# Patient Record
Sex: FEMALE | Race: WHITE | NOT HISPANIC OR LATINO | URBAN - METROPOLITAN AREA
[De-identification: names, ages, dates, MRNs, and addresses within clinical notes are randomized per-mention and may not be internally consistent; named-entity substitution may affect disease eponyms.]

---

## 2023-04-18 LAB
ALANINE AMINOTRANSFERASE (SGPT) (U/L) IN SER/PLAS: 42 U/L (ref 7–45)
ALBUMIN (G/DL) IN SER/PLAS: 4 G/DL (ref 3.4–5)
ALKALINE PHOSPHATASE (U/L) IN SER/PLAS: 68 U/L (ref 33–110)
ANION GAP IN SER/PLAS: 12 MMOL/L (ref 10–20)
ASPARTATE AMINOTRANSFERASE (SGOT) (U/L) IN SER/PLAS: 22 U/L (ref 9–39)
BILIRUBIN TOTAL (MG/DL) IN SER/PLAS: 0.8 MG/DL (ref 0–1.2)
CALCIDIOL (25 OH VITAMIN D3) (NG/ML) IN SER/PLAS: 29 NG/ML
CALCIUM (MG/DL) IN SER/PLAS: 8.9 MG/DL (ref 8.6–10.3)
CARBON DIOXIDE, TOTAL (MMOL/L) IN SER/PLAS: 27 MMOL/L (ref 21–32)
CHLORIDE (MMOL/L) IN SER/PLAS: 104 MMOL/L (ref 98–107)
CHOLESTEROL (MG/DL) IN SER/PLAS: 208 MG/DL (ref 0–199)
CHOLESTEROL IN HDL (MG/DL) IN SER/PLAS: 55 MG/DL
CHOLESTEROL/HDL RATIO: 3.8
CREATININE (MG/DL) IN SER/PLAS: 0.91 MG/DL (ref 0.5–1.05)
ERYTHROCYTE DISTRIBUTION WIDTH (RATIO) BY AUTOMATED COUNT: 13.1 % (ref 11.5–14.5)
ERYTHROCYTE MEAN CORPUSCULAR HEMOGLOBIN CONCENTRATION (G/DL) BY AUTOMATED: 32.2 G/DL (ref 32–36)
ERYTHROCYTE MEAN CORPUSCULAR VOLUME (FL) BY AUTOMATED COUNT: 86 FL (ref 80–100)
ERYTHROCYTES (10*6/UL) IN BLOOD BY AUTOMATED COUNT: 4.87 X10E12/L (ref 4–5.2)
GFR FEMALE: 77 ML/MIN/1.73M2
GLUCOSE (MG/DL) IN SER/PLAS: 70 MG/DL (ref 74–99)
HEMATOCRIT (%) IN BLOOD BY AUTOMATED COUNT: 41.9 % (ref 36–46)
HEMOGLOBIN (G/DL) IN BLOOD: 13.5 G/DL (ref 12–16)
LDL: 125 MG/DL (ref 0–99)
LEUKOCYTES (10*3/UL) IN BLOOD BY AUTOMATED COUNT: 6.5 X10E9/L (ref 4.4–11.3)
PLATELETS (10*3/UL) IN BLOOD AUTOMATED COUNT: 289 X10E9/L (ref 150–450)
POTASSIUM (MMOL/L) IN SER/PLAS: 4.2 MMOL/L (ref 3.5–5.3)
PROTEIN TOTAL: 6.6 G/DL (ref 6.4–8.2)
SODIUM (MMOL/L) IN SER/PLAS: 139 MMOL/L (ref 136–145)
THYROTROPIN (MIU/L) IN SER/PLAS BY DETECTION LIMIT <= 0.05 MIU/L: 2.29 MIU/L (ref 0.44–3.98)
TRIGLYCERIDE (MG/DL) IN SER/PLAS: 140 MG/DL (ref 0–149)
UREA NITROGEN (MG/DL) IN SER/PLAS: 11 MG/DL (ref 6–23)
VLDL: 28 MG/DL (ref 0–40)

## 2023-08-21 ENCOUNTER — APPOINTMENT (OUTPATIENT)
Dept: PRIMARY CARE | Facility: CLINIC | Age: 48
End: 2023-08-21
Payer: COMMERCIAL

## 2023-08-22 PROBLEM — M26.629 TMJ SYNDROME: Status: ACTIVE | Noted: 2023-08-22

## 2023-08-22 PROBLEM — E55.9 VITAMIN D DEFICIENCY: Status: ACTIVE | Noted: 2023-08-22

## 2023-08-22 PROBLEM — R22.1 NECK MASS: Status: ACTIVE | Noted: 2023-08-22

## 2023-08-22 PROBLEM — I83.819 VARICOSE VEINS WITH PAIN: Status: ACTIVE | Noted: 2023-08-22

## 2023-08-22 PROBLEM — E04.1 SOLITARY THYROID NODULE: Status: ACTIVE | Noted: 2023-08-22

## 2023-08-22 PROBLEM — R20.2 PARESTHESIA OF LEFT LEG: Status: ACTIVE | Noted: 2023-08-22

## 2023-08-22 PROBLEM — G43.909 MIGRAINE HEADACHE: Status: ACTIVE | Noted: 2023-08-22

## 2023-08-22 PROBLEM — R56.9 SEIZURE (MULTI): Status: ACTIVE | Noted: 2023-08-22

## 2023-08-22 PROBLEM — F41.9 ANXIETY: Status: ACTIVE | Noted: 2023-08-22

## 2023-08-22 PROBLEM — R20.0 NUMBNESS ON LEFT SIDE: Status: ACTIVE | Noted: 2023-08-22

## 2023-08-22 PROBLEM — G57.12 MERALGIA PARESTHETICA OF LEFT SIDE: Status: ACTIVE | Noted: 2023-08-22

## 2023-08-22 PROBLEM — H69.90 EUSTACHIAN TUBE DYSFUNCTION: Status: ACTIVE | Noted: 2023-08-22

## 2023-08-22 PROBLEM — E87.6 HYPOKALEMIA: Status: ACTIVE | Noted: 2023-08-22

## 2023-08-22 PROBLEM — G37.9 DEMYELINATING DISEASE (MULTI): Status: ACTIVE | Noted: 2023-08-22

## 2023-08-22 PROBLEM — I83.93 VARICOSE VEINS OF LEGS: Status: ACTIVE | Noted: 2023-08-22

## 2023-08-22 PROBLEM — R07.0 THROAT DISCOMFORT: Status: ACTIVE | Noted: 2023-08-22

## 2023-08-22 PROBLEM — I87.2 VENOUS INSUFFICIENCY: Status: ACTIVE | Noted: 2023-08-22

## 2023-08-22 PROBLEM — R20.0 NUMBNESS OF LEFT FOOT: Status: ACTIVE | Noted: 2023-08-22

## 2023-08-22 PROBLEM — J32.9 SINUSITIS: Status: ACTIVE | Noted: 2023-08-22

## 2023-08-22 PROBLEM — R09.A2 GLOBUS PHARYNGEUS: Status: ACTIVE | Noted: 2023-08-22

## 2023-08-22 PROBLEM — H92.02 OTALGIA OF LEFT EAR: Status: ACTIVE | Noted: 2023-08-22

## 2023-08-22 PROBLEM — E04.9 GOITER: Status: ACTIVE | Noted: 2023-08-22

## 2023-08-22 PROBLEM — N39.0 UTI (URINARY TRACT INFECTION): Status: ACTIVE | Noted: 2023-08-22

## 2023-08-22 RX ORDER — MULTIVITAMIN
TABLET ORAL
COMMUNITY
Start: 2017-10-31

## 2023-08-23 ENCOUNTER — OFFICE VISIT (OUTPATIENT)
Dept: PRIMARY CARE | Facility: CLINIC | Age: 48
End: 2023-08-23
Payer: COMMERCIAL

## 2023-08-23 VITALS
HEIGHT: 66 IN | DIASTOLIC BLOOD PRESSURE: 84 MMHG | SYSTOLIC BLOOD PRESSURE: 126 MMHG | HEART RATE: 77 BPM | RESPIRATION RATE: 16 BRPM | BODY MASS INDEX: 30.31 KG/M2 | OXYGEN SATURATION: 97 % | WEIGHT: 188.6 LBS | TEMPERATURE: 97.3 F

## 2023-08-23 DIAGNOSIS — E04.9 GOITER: ICD-10-CM

## 2023-08-23 DIAGNOSIS — E55.9 VITAMIN D DEFICIENCY: ICD-10-CM

## 2023-08-23 DIAGNOSIS — K64.4 EXTERNAL HEMORRHOID: Primary | ICD-10-CM

## 2023-08-23 PROCEDURE — 99214 OFFICE O/P EST MOD 30 MIN: CPT | Performed by: INTERNAL MEDICINE

## 2023-08-23 PROCEDURE — 1036F TOBACCO NON-USER: CPT | Performed by: INTERNAL MEDICINE

## 2023-08-23 RX ORDER — HYDROCORTISONE 25 MG/G
CREAM TOPICAL 4 TIMES DAILY PRN
Qty: 30 G | Refills: 5 | Status: SHIPPED | OUTPATIENT
Start: 2023-08-23 | End: 2024-03-29 | Stop reason: ALTCHOICE

## 2023-08-23 ASSESSMENT — ENCOUNTER SYMPTOMS
PSYCHIATRIC NEGATIVE: 1
COLOR CHANGE: 0
EYE DISCHARGE: 0
WEAKNESS: 0
WOUND: 0
NECK STIFFNESS: 0
LOSS OF SENSATION IN FEET: 0
RECTAL PAIN: 1
ABDOMINAL PAIN: 0
LIGHT-HEADEDNESS: 0
TROUBLE SWALLOWING: 0
DEPRESSION: 0
FREQUENCY: 0
HEMATOLOGIC/LYMPHATIC NEGATIVE: 1
DIFFICULTY URINATING: 0
CONSTITUTIONAL NEGATIVE: 1
HEADACHES: 0
CONSTIPATION: 0
ADENOPATHY: 0
BRUISES/BLEEDS EASILY: 0
NECK PAIN: 0
COUGH: 0
SLEEP DISTURBANCE: 0
FACIAL ASYMMETRY: 0
CHEST TIGHTNESS: 0
POLYDIPSIA: 0
EYE PAIN: 0
ENDOCRINE NEGATIVE: 1
BACK PAIN: 0
TREMORS: 0
SINUS PAIN: 0
ALLERGIC/IMMUNOLOGIC NEGATIVE: 1
SHORTNESS OF BREATH: 0
EYE REDNESS: 0
JOINT SWELLING: 0
MUSCULOSKELETAL NEGATIVE: 1
SEIZURES: 0
CARDIOVASCULAR NEGATIVE: 1
SINUS PRESSURE: 0
AGITATION: 0
BLOOD IN STOOL: 0
DIARRHEA: 0
NERVOUS/ANXIOUS: 0
HALLUCINATIONS: 0
ACTIVITY CHANGE: 0
UNEXPECTED WEIGHT CHANGE: 0
POLYPHAGIA: 0
APPETITE CHANGE: 0
NAUSEA: 0
OCCASIONAL FEELINGS OF UNSTEADINESS: 0
RESPIRATORY NEGATIVE: 1
STRIDOR: 0
MYALGIAS: 0
VOMITING: 0
EYES NEGATIVE: 1
WHEEZING: 0
SORE THROAT: 0
DIZZINESS: 0
NEUROLOGICAL NEGATIVE: 1
NUMBNESS: 0
FLANK PAIN: 0
PALPITATIONS: 0
VOICE CHANGE: 0
SPEECH DIFFICULTY: 0
ARTHRALGIAS: 0
CONFUSION: 0
DYSURIA: 0

## 2023-08-23 ASSESSMENT — PATIENT HEALTH QUESTIONNAIRE - PHQ9
2. FEELING DOWN, DEPRESSED OR HOPELESS: NOT AT ALL
1. LITTLE INTEREST OR PLEASURE IN DOING THINGS: NOT AT ALL
SUM OF ALL RESPONSES TO PHQ9 QUESTIONS 1 AND 2: 0

## 2023-08-23 NOTE — PROGRESS NOTES
Subjective   Patient ID: Chelsey Anand is a 48 y.o. female who presents for Hemorrhoids (Patient is here to discuss hemorrhoids. /).  HPI    Patient has been concerned about discomfort related to hemorrhoids. She developed hemorrhoids after delivering her child. Initially had some episodes of bleeding which subsequently resolved.  Denies any rectal bleed recently, she gets discomfort and itching on and off especially when sitting.  She works in the dental office, sitting most of time.  Denies constipation.  We reviewed last colonoscopy report: 3/2022.  Internal hemorhoids noted  Another concern is thyroid goiter and thyroid nodules. Patient used to see endocrinologist at [facility] who relocated. Will refer to  endocrinologist.        Review of Systems   Constitutional: Negative.  Negative for activity change, appetite change and unexpected weight change.   HENT: Negative.  Negative for congestion, ear discharge, ear pain, hearing loss, mouth sores, nosebleeds, sinus pressure, sinus pain, sore throat, trouble swallowing and voice change.    Eyes: Negative.  Negative for pain, discharge, redness and visual disturbance.   Respiratory: Negative.  Negative for cough, chest tightness, shortness of breath, wheezing and stridor.    Cardiovascular: Negative.  Negative for chest pain, palpitations and leg swelling.   Gastrointestinal:  Positive for rectal pain. Negative for abdominal pain, blood in stool, constipation, diarrhea, nausea and vomiting.   Endocrine: Negative.  Negative for polydipsia, polyphagia and polyuria.   Genitourinary: Negative.  Negative for difficulty urinating, dysuria, flank pain, frequency and urgency.   Musculoskeletal: Negative.  Negative for arthralgias, back pain, gait problem, joint swelling, myalgias, neck pain and neck stiffness.   Skin: Negative.  Negative for color change, rash and wound.   Allergic/Immunologic: Negative.  Negative for environmental allergies, food allergies and  "immunocompromised state.   Neurological: Negative.  Negative for dizziness, tremors, seizures, syncope, facial asymmetry, speech difficulty, weakness, light-headedness, numbness and headaches.   Hematological: Negative.  Negative for adenopathy. Does not bruise/bleed easily.   Psychiatric/Behavioral: Negative.  Negative for agitation, behavioral problems, confusion, hallucinations, sleep disturbance and suicidal ideas. The patient is not nervous/anxious.    All other systems reviewed and are negative.      Objective     Review of systems was performed and all systems were negative except what in HPI    /84   Pulse 77   Temp 36.3 °C (97.3 °F)   Resp 16   Ht 1.676 m (5' 6\")   Wt 85.5 kg (188 lb 9.6 oz)   SpO2 97%   BMI 30.44 kg/m²    Physical Exam  Vitals and nursing note reviewed.   Constitutional:       General: She is not in acute distress.     Appearance: Normal appearance.   HENT:      Head: Normocephalic and atraumatic.      Right Ear: External ear normal.      Left Ear: External ear normal.      Nose: Nose normal. No congestion or rhinorrhea.   Eyes:      General:         Right eye: No discharge.         Left eye: No discharge.      Extraocular Movements: Extraocular movements intact.      Conjunctiva/sclera: Conjunctivae normal.      Pupils: Pupils are equal, round, and reactive to light.   Neck:      Comments: Mildly enlarged thyroid gland noted.   Cardiovascular:      Rate and Rhythm: Normal rate and regular rhythm.      Pulses: Normal pulses.      Heart sounds: Normal heart sounds. No murmur heard.     No friction rub. No gallop.   Pulmonary:      Effort: Pulmonary effort is normal. No respiratory distress.      Breath sounds: Normal breath sounds. No stridor. No wheezing, rhonchi or rales.   Chest:      Chest wall: No tenderness.   Abdominal:      General: Bowel sounds are normal.      Palpations: Abdomen is soft. There is no mass.      Tenderness: There is no abdominal tenderness. There is no " guarding or rebound.   Genitourinary:     Rectum: Guaiac result negative.   Musculoskeletal:         General: No swelling or deformity. Normal range of motion.      Cervical back: Normal range of motion and neck supple. No rigidity or tenderness.      Right lower leg: No edema.      Left lower leg: No edema.   Lymphadenopathy:      Cervical: No cervical adenopathy.   Skin:     General: Skin is warm and dry.      Coloration: Skin is not jaundiced.      Findings: No bruising or erythema.   Neurological:      General: No focal deficit present.      Mental Status: She is alert and oriented to person, place, and time. Mental status is at baseline.      Cranial Nerves: No cranial nerve deficit.      Motor: No weakness.      Coordination: Coordination normal.      Gait: Gait normal.   Psychiatric:         Mood and Affect: Mood normal.         Behavior: Behavior normal.         Thought Content: Thought content normal.         Judgment: Judgment normal.     Rectal exam: small external hemorrhoid noted without signs of bleeding.  Sphincter tone normal.  Internal hemorrhoids felt, no other rectal masses felt. Stool Guaiac: negative.     Assessment/Plan   Problem List Items Addressed This Visit          Endocrine/Metabolic    Goiter     Consult endocrinologist.         Relevant Orders    Referral to Endocrinology    Vitamin D deficiency     Continue Vit d supplement.             Gastrointestinal and Abdominal    External hemorrhoid - Primary     Avoid constipation. Use preparation H as needed, Hydrocortisone cream if you experience rectal itching and preparation H not effective.   Consider rectal surgery consult if symptoms get worse and/or rectal bleed starts.          Relevant Medications    hydrocortisone (Anusol-HC) 2.5 % rectal cream     It was a pleasure to see you today.  I would like to remind you about importance of a healthy lifestyle in order to improve your well-being and live longer.  Try to engage in physical  activities for at least 150 minutes per week.  Eat about 10 servings of fruits and vegetables daily. My advice is 2 servings of fruits and 8 servings of vegetables.  For vegetables choose at least half of them green and at least half of them fresh.  Please avoid sugar, salt, fried food and saturated fat.    I spent a total of 30 minutes on the date of service for follow up visit, which included preparing to see the patient, face-to-face patient care, completing clinical documentation, obtaining and/or reviewing separately obtained history, performing a medically appropriate examination, counseling and educating the patient/family/caregiver, ordering medications, tests, or procedures, communicating with other health care providers (not separately reported), independently interpreting results (not separately reported), communicating results to the patient/family/caregiver, and care coordination (not separately reported).

## 2023-08-24 NOTE — ASSESSMENT & PLAN NOTE
Avoid constipation. Use preparation H as needed, Hydrocortisone cream if you experience rectal itching and preparation H not effective.   Consider rectal surgery consult if symptoms get worse and/or rectal bleed starts.

## 2023-09-12 ENCOUNTER — TELEPHONE (OUTPATIENT)
Dept: PRIMARY CARE | Facility: CLINIC | Age: 48
End: 2023-09-12
Payer: COMMERCIAL

## 2023-09-12 DIAGNOSIS — E04.1 SOLITARY THYROID NODULE: Primary | ICD-10-CM

## 2023-09-12 NOTE — TELEPHONE ENCOUNTER
Patient is unable to get in with Endocrinology until May of 2024. Asking if Dr. Au would be able to order thyroid ultrasound and blood work? Would like to be seen sooner please advise     [phone]

## 2023-09-15 ENCOUNTER — LAB (OUTPATIENT)
Dept: LAB | Facility: LAB | Age: 48
End: 2023-09-15
Payer: COMMERCIAL

## 2023-09-15 DIAGNOSIS — E04.1 SOLITARY THYROID NODULE: ICD-10-CM

## 2023-09-15 LAB
THYROTROPIN (MIU/L) IN SER/PLAS BY DETECTION LIMIT <= 0.05 MIU/L: 3.03 MIU/L (ref 0.44–3.98)
THYROXINE (T4) FREE (NG/DL) IN SER/PLAS: 0.97 NG/DL (ref 0.61–1.12)
TRIIODOTHYRONINE (T3) (NG/DL) IN SER/PLAS: 121 NG/DL (ref 60–200)

## 2023-09-15 PROCEDURE — 84480 ASSAY TRIIODOTHYRONINE (T3): CPT

## 2023-09-15 PROCEDURE — 84439 ASSAY OF FREE THYROXINE: CPT

## 2023-09-15 PROCEDURE — 36415 COLL VENOUS BLD VENIPUNCTURE: CPT

## 2023-09-15 PROCEDURE — 84443 ASSAY THYROID STIM HORMONE: CPT

## 2023-09-17 DIAGNOSIS — E04.2 MULTIPLE THYROID NODULES: Primary | ICD-10-CM

## 2023-10-09 ENCOUNTER — OFFICE VISIT (OUTPATIENT)
Dept: OTOLARYNGOLOGY | Facility: CLINIC | Age: 48
End: 2023-10-09
Payer: COMMERCIAL

## 2023-10-09 VITALS — HEIGHT: 67 IN | BODY MASS INDEX: 29.91 KG/M2 | WEIGHT: 190.6 LBS

## 2023-10-09 DIAGNOSIS — E04.2 MULTIPLE THYROID NODULES: ICD-10-CM

## 2023-10-09 DIAGNOSIS — R09.A2 GLOBUS SENSATION: Primary | ICD-10-CM

## 2023-10-09 PROCEDURE — 31575 DIAGNOSTIC LARYNGOSCOPY: CPT | Performed by: OTOLARYNGOLOGY

## 2023-10-09 PROCEDURE — 99244 OFF/OP CNSLTJ NEW/EST MOD 40: CPT | Performed by: OTOLARYNGOLOGY

## 2023-10-09 PROCEDURE — 1036F TOBACCO NON-USER: CPT | Performed by: OTOLARYNGOLOGY

## 2023-10-09 RX ORDER — ACETAMINOPHEN 500 MG
125 TABLET ORAL DAILY
COMMUNITY

## 2023-10-09 ASSESSMENT — PATIENT HEALTH QUESTIONNAIRE - PHQ9
1. LITTLE INTEREST OR PLEASURE IN DOING THINGS: NOT AT ALL
SUM OF ALL RESPONSES TO PHQ9 QUESTIONS 1 & 2: 0
2. FEELING DOWN, DEPRESSED OR HOPELESS: NOT AT ALL

## 2023-10-09 NOTE — PROGRESS NOTES
History Of Present Illness  Chelsey Anand is a 48 y.o. female presenting to establish care for thyroid nodule.  Patient states that she has had this nodule followed for close to 10 years.  She previously had a biopsy that was reportedly benign.  She has ultrasounds dating back to 2013.  At that time it measured 1.3 cm.  She has several ultrasounds over the last 4 years that have not shown significant growth of the nodules.  Currently it measures about 2 cm and is categorized as a TI-RADS 3 nodule following under the cutoff for biopsy.  She has not had any voice issues.  She occasionally has some globus symptoms but also has history of reflux.  She started taking Prilosec about 1 week ago.  She does not have classic symptoms of reflux however she does have some diet and lifestyle risk factors and also reports history of snoring.     Past Medical History  She has a past medical history of Noninfective gastroenteritis and colitis, unspecified and Personal history of other diseases of the nervous system and sense organs.    Surgical History  She has a past surgical history that includes Colonoscopy (11/28/2012).     Social History  She reports that she has never smoked. She has never been exposed to tobacco smoke. She has never used smokeless tobacco. No history on file for alcohol use and drug use.    Family History  Family History   Problem Relation Name Age of Onset    Other (CABG) Father      Diabetes Other      Hypertension Other          Allergies  Penicillins    Review of Systems     Physical Exam:  CONSTITUTIONAL:  No acute distress  VOICE:  No hoarseness or other abnormality  RESPIRATION:  Breathing comfortably, no stridor  CV:  No clubbing/cyanosis/edema in hands  EYES:  EOM intact, sclera normal  NEURO:  Alert and oriented times 3, Cranial nerves II-XII grossly intact and symmetric bilaterally  HEAD AND FACE:  Symmetric facial features, no masses or lesions, sinuses non-tender to palpation  SALIVARY GLANDS:   Parotid and submandibular glands normal bilaterally  EARS:  Normal external ears, external auditory canals, and TMs to otoscopy, normal hearing to whispered voice.  NOSE:  External nose midline, anterior rhinoscopy is normal with limited visualization to the anterior aspect of the interior turbinates, no bleeding or drainage, no lesions  ORAL CAVITY/OROPHARYNX/LIPS:  Normal mucous membranes, normal floor of mouth/tongue/OP, no masses or lesions  PHARYNGEAL WALLS:  No masses or lesions  NECK/LYMPH:  No LAD, palpable right thyroid nodule that is mobile and nontender, trachea midline  SKIN:  Neck skin is without scar or injury  PSYCH:  Alert and oriented with appropriate mood and affect     Procedure Note: Flexible Nasolaryngoscopy  Verbal informed consent was obtained from the patient. 4% lidocaine mixed with phenylephrine was prepared and dripped into the nose. It was placed in the right naris. Following an appropriate amount of time to allow for adequate anesthesia, a flexible fiberoptic nasolaryngoscope was placed into the patient's right naris. The nasal cavity, nasopharynx, oropharynx, hypopharynx, and all endolaryngeal structures were visualized and were normal except as listed below.     Significant findings included:  -normal TVC movement  -mild mucus stranding  -no mucosal abnormalities      US thyroid    Result Date: 9/16/2023  Interpreted By:  RAJAT TREVIÑO MD MRN: [id number] Patient Name: COLLEEN GREGORIO  STUDY: US THYROID;  9/15/2023 9:14 am  INDICATION: thyroid nodules f/up.  COMPARISON: Thyroid ultrasound 01/14/2022  ACCESSION NUMBER(S): [id number]  ORDERING CLINICIAN: NEISHA GARCIA  TECHNIQUE: Multiple ultrasonographic images of the thyroid gland and surrounding tissues were obtained.  FINDINGS: PARENCHYMA:  SIZE: RIGHT LOBE: 4.7 x 2.1 x 2.2 cm; homogeneous echotexture with normal vascularity. LEFT LOBE: 3.3 x 0.8 x 1.7 cm; homogeneous echotexture with normal vascularity. ISTHMUS: 0.3 cm  NODULES:  (Please note, assessment and description of nodules is per TI-RADS criteria. Up to 4 total nodules described, which includes largest and/or most clinically significant based on morphology.) It is noted that some spongiform and/or cystic nodules may not be specifically described and are TR category 1 (benign).  NODULE #: 1.  Location: Right interpolar region Size: 21 x 10 x 20 mm, previously 20 x 9 x 16 mm. Composition:  Solid or almost completely solid (2) Echogenicity:  Hyperechoic or isoechoic (1) Shape:  Wider-than-tall (0) Margin:  Smooth (0) Echogenic Foci:  None or Large comet-tail artifacts (0)  If present previously: Significant change in size (>/= 20% diameter increase in at least two dimensions and minimal increase of 2mm?): No significant change. Change in features or ACR TI-RADS category: No change.  The total score of this nodule is 3 points, corresponding to a TI-RADS category  3; (3 points) Mildly suspicious.  NODULE #: 2.  Location: Left inferior pole Size: 5 x 4 x 5 mm Composition: mixed cystic and solid (1) Echogenicity: Hyperechoic or isoechoic (1) Shape:  Wider-than-tall (0) Margin:  Smooth (0) Echogenic Foci: None or Large comet-tail artifacts (0)  If present previously: Significant change in size (>/= 20% diameter increase in at least two dimensions and minimal increase of 2mm?): not applicable Change in features or ACR TI-RADS category: not applicable  The total score of this nodule is 2 points, corresponding to a TI-RADS category 2; (2 points) not suspicious.  CERVICAL LYMPH NODES: No enlarged or morphologically abnormal cervical nodes are seen.      1. Stable right 21 mm TIRADS 3 nodule, since 2022. Please see below for recommendations.   Please note that these statements are based on the recommendations of the American College [employer]  TI-RADS grading system. ACR TI-RADS recommendations (apply to nodules which have NOT been biopsied):  TR5 (7 or more points) highly suspicious - FNA if  ? 1cm, follow-up if 0.5 -0.9 cm every year for 5 years. Aggregate cancer risk 35%. TR4 (4-6 points) moderately suspicious - FNA if ? 1.5cm, follow-up if 1 -1.4 cm in 1, 2, 3 and 5 years. Aggregate cancer risk 9.1% TR3 (3 points) mildly suspicious - FNA if ? 2.5cm, follow-up if 1.5 -2.4 cm in 1, 3 and 5 years. Aggregate cancer risk 4.8% TR2 (2 points) not suspicious. No FNA or follow-up. Aggregate cancer risk 1.5% TR1 (0 points) benign - No FNA or follow-up. Aggregate cancer risk 0.3%     Assessment/Plan   48-year-old female here for evaluation of globus sensation as well as history of thyroid nodules.  She has a longstanding history of thyroid nodules that have been monitored.  There has not been any significant change over the last 2 years.  Compared to imaging 10 years ago there has been slight growth.  It was biopsied at some point and returned as benign.  Currently is categorized as a TI-RADS 3 and follows under the cutoff for biopsy.  Scope exam performed due to globus sensation with no significant findings    -Thyroid nodules: Follow-up thyroid ultrasound in 1 year.  She will return earlier if she notices any changes because she does check it on occasion and it is palpable on the anterior neck  -Globus sensation: She just started Prilosec 1 week ago.  Encouraged her to continue this if she has any improvement over the next month or so.  Advised her to follow-up again if she has progression in severity of symptoms  -Follow-up in 1 year after ultrasound      Lele Rogers MD  Dept. of Otolaryngology - Head and Neck Surgery

## 2023-12-11 ENCOUNTER — APPOINTMENT (OUTPATIENT)
Dept: ENDOCRINOLOGY | Facility: CLINIC | Age: 48
End: 2023-12-11
Payer: COMMERCIAL

## 2023-12-22 ENCOUNTER — OFFICE VISIT (OUTPATIENT)
Dept: PRIMARY CARE | Facility: CLINIC | Age: 48
End: 2023-12-22
Payer: COMMERCIAL

## 2023-12-22 VITALS
HEIGHT: 66 IN | BODY MASS INDEX: 29.35 KG/M2 | RESPIRATION RATE: 16 BRPM | OXYGEN SATURATION: 97 % | SYSTOLIC BLOOD PRESSURE: 120 MMHG | TEMPERATURE: 97.3 F | DIASTOLIC BLOOD PRESSURE: 71 MMHG | HEART RATE: 74 BPM | WEIGHT: 182.6 LBS

## 2023-12-22 DIAGNOSIS — E04.9 GOITER: ICD-10-CM

## 2023-12-22 DIAGNOSIS — Z00.00 HEALTHCARE MAINTENANCE: ICD-10-CM

## 2023-12-22 DIAGNOSIS — E78.5 DYSLIPIDEMIA: ICD-10-CM

## 2023-12-22 DIAGNOSIS — E55.9 VITAMIN D DEFICIENCY: Primary | ICD-10-CM

## 2023-12-22 PROCEDURE — 1036F TOBACCO NON-USER: CPT | Performed by: INTERNAL MEDICINE

## 2023-12-22 PROCEDURE — 93000 ELECTROCARDIOGRAM COMPLETE: CPT | Performed by: INTERNAL MEDICINE

## 2023-12-22 PROCEDURE — 99396 PREV VISIT EST AGE 40-64: CPT | Performed by: INTERNAL MEDICINE

## 2023-12-22 ASSESSMENT — ENCOUNTER SYMPTOMS
AGITATION: 0
BRUISES/BLEEDS EASILY: 0
SORE THROAT: 0
HEADACHES: 0
NECK STIFFNESS: 0
UNEXPECTED WEIGHT CHANGE: 0
ABDOMINAL PAIN: 0
ENDOCRINE NEGATIVE: 1
GASTROINTESTINAL NEGATIVE: 1
EYE PAIN: 0
ARTHRALGIAS: 0
POLYDIPSIA: 0
ALLERGIC/IMMUNOLOGIC NEGATIVE: 1
PALPITATIONS: 0
DYSURIA: 0
DEPRESSION: 0
CONSTIPATION: 0
NAUSEA: 0
COUGH: 0
LIGHT-HEADEDNESS: 0
EYES NEGATIVE: 1
SEIZURES: 0
FREQUENCY: 0
NERVOUS/ANXIOUS: 0
SINUS PAIN: 0
EYE REDNESS: 0
ACTIVITY CHANGE: 0
OCCASIONAL FEELINGS OF UNSTEADINESS: 0
WHEEZING: 0
TROUBLE SWALLOWING: 0
SHORTNESS OF BREATH: 0
SPEECH DIFFICULTY: 0
NECK PAIN: 0
EYE DISCHARGE: 0
APPETITE CHANGE: 0
CONSTITUTIONAL NEGATIVE: 1
FACIAL ASYMMETRY: 0
ADENOPATHY: 0
BLOOD IN STOOL: 0
DIFFICULTY URINATING: 0
CHEST TIGHTNESS: 0
VOMITING: 0
COLOR CHANGE: 0
HEMATOLOGIC/LYMPHATIC NEGATIVE: 1
TREMORS: 0
VOICE CHANGE: 0
MYALGIAS: 0
SLEEP DISTURBANCE: 0
MUSCULOSKELETAL NEGATIVE: 1
BACK PAIN: 0
LOSS OF SENSATION IN FEET: 0
STRIDOR: 0
RESPIRATORY NEGATIVE: 1
HALLUCINATIONS: 0
DIZZINESS: 0
POLYPHAGIA: 0
FLANK PAIN: 0
DIARRHEA: 0
JOINT SWELLING: 0
NUMBNESS: 0
CONFUSION: 0
PSYCHIATRIC NEGATIVE: 1
WOUND: 0
NEUROLOGICAL NEGATIVE: 1
WEAKNESS: 0
SINUS PRESSURE: 0
CARDIOVASCULAR NEGATIVE: 1

## 2023-12-22 ASSESSMENT — PATIENT HEALTH QUESTIONNAIRE - PHQ9
SUM OF ALL RESPONSES TO PHQ9 QUESTIONS 1 AND 2: 0
2. FEELING DOWN, DEPRESSED OR HOPELESS: NOT AT ALL
1. LITTLE INTEREST OR PLEASURE IN DOING THINGS: NOT AT ALL

## 2023-12-22 NOTE — PROGRESS NOTES
Subjective   Patient ID: Chelsey Anand is a 48 y.o. female who presents for Annual Exam (Patient is here for a physical. ).  HPI    Patient has been feeling pretty good and has been complaint with prescribed medications.    We reviewed and discussed details of recent blood work: CBC, CMP, TSH, Lipid panel, Vit D done in 2023.  Results within acceptable range.    Last mammogram: 12/2022  PAP: utd, per GYN  Colonoscopy: 2022, next 2032      Review of Systems   Constitutional: Negative.  Negative for activity change, appetite change and unexpected weight change.   HENT: Negative.  Negative for congestion, ear discharge, ear pain, hearing loss, mouth sores, nosebleeds, sinus pressure, sinus pain, sore throat, trouble swallowing and voice change.    Eyes: Negative.  Negative for pain, discharge, redness and visual disturbance.   Respiratory: Negative.  Negative for cough, chest tightness, shortness of breath, wheezing and stridor.    Cardiovascular: Negative.  Negative for chest pain, palpitations and leg swelling.   Gastrointestinal: Negative.  Negative for abdominal pain, blood in stool, constipation, diarrhea, nausea and vomiting.   Endocrine: Negative.  Negative for polydipsia, polyphagia and polyuria.   Genitourinary: Negative.  Negative for difficulty urinating, dysuria, flank pain, frequency and urgency.   Musculoskeletal: Negative.  Negative for arthralgias, back pain, gait problem, joint swelling, myalgias, neck pain and neck stiffness.   Skin: Negative.  Negative for color change, rash and wound.   Allergic/Immunologic: Negative.  Negative for environmental allergies, food allergies and immunocompromised state.   Neurological: Negative.  Negative for dizziness, tremors, seizures, syncope, facial asymmetry, speech difficulty, weakness, light-headedness, numbness and headaches.   Hematological: Negative.  Negative for adenopathy. Does not bruise/bleed easily.   Psychiatric/Behavioral: Negative.  Negative for  "agitation, behavioral problems, confusion, hallucinations, sleep disturbance and suicidal ideas. The patient is not nervous/anxious.    All other systems reviewed and are negative.      Objective     Review of systems was performed and all systems were negative except what in HPI    /71   Pulse 74   Temp 36.3 °C (97.3 °F)   Resp 16   Ht 1.676 m (5' 6\")   Wt 82.8 kg (182 lb 9.6 oz)   SpO2 97%   BMI 29.47 kg/m²    Physical Exam  Vitals and nursing note reviewed.   Constitutional:       General: She is not in acute distress.     Appearance: Normal appearance.   HENT:      Head: Normocephalic and atraumatic.      Right Ear: Tympanic membrane, ear canal and external ear normal.      Left Ear: Tympanic membrane, ear canal and external ear normal.      Nose: Nose normal. No congestion or rhinorrhea.      Mouth/Throat:      Mouth: Mucous membranes are moist.      Pharynx: Oropharynx is clear.   Eyes:      General:         Right eye: No discharge.         Left eye: No discharge.      Extraocular Movements: Extraocular movements intact.      Conjunctiva/sclera: Conjunctivae normal.      Pupils: Pupils are equal, round, and reactive to light.   Cardiovascular:      Rate and Rhythm: Normal rate and regular rhythm.      Pulses: Normal pulses.      Heart sounds: Normal heart sounds. No murmur heard.     No friction rub. No gallop.   Pulmonary:      Effort: Pulmonary effort is normal. No respiratory distress.      Breath sounds: Normal breath sounds. No stridor. No wheezing, rhonchi or rales.   Chest:      Chest wall: No tenderness.   Abdominal:      General: Bowel sounds are normal.      Palpations: Abdomen is soft. There is no mass.      Tenderness: There is no abdominal tenderness. There is no guarding or rebound.   Musculoskeletal:         General: No swelling or deformity. Normal range of motion.      Cervical back: Normal range of motion and neck supple. No rigidity or tenderness.      Right lower leg: No edema. "      Left lower leg: No edema.   Lymphadenopathy:      Cervical: No cervical adenopathy.   Skin:     General: Skin is warm and dry.      Coloration: Skin is not jaundiced.      Findings: No bruising or erythema.   Neurological:      General: No focal deficit present.      Mental Status: She is alert and oriented to person, place, and time. Mental status is at baseline.      Cranial Nerves: No cranial nerve deficit.      Motor: No weakness.      Coordination: Coordination normal.      Gait: Gait normal.   Psychiatric:         Mood and Affect: Mood normal.         Behavior: Behavior normal.         Thought Content: Thought content normal.         Judgment: Judgment normal.         Assessment/Plan   Problem List Items Addressed This Visit             ICD-10-CM       Cardiac and Vasculature    Dyslipidemia E78.5     Low cholesterol and low carbohydrate diet is advised.          Relevant Orders    ECG 12 lead (Clinic Performed) (Completed)       Endocrine/Metabolic    Goiter E04.9     Consult endocrinologist.         Relevant Orders    TSH with reflex to Free T4 if abnormal    Vitamin D deficiency - Primary E55.9     Continue Vit d supplement.          Relevant Orders    Vitamin D 25-Hydroxy,Total (for eval of Vitamin D levels)       Health Encounters    Healthcare maintenance Z00.00    Relevant Orders    ECG 12 lead (Clinic Performed) (Completed)    CBC    Comprehensive Metabolic Panel    Lipid Panel    Urinalysis with Reflex Microscopic   It was a pleasure to see you today.  I would like to remind you about importance of a healthy lifestyle in order to improve your well-being and live longer.  Try to engage in physical activities for at least 150 minutes per week.  Eat about 10 servings of fruits and vegetables daily. My advice is 2 servings of fruits and 8 servings of vegetables.  For vegetables choose at least half of them green and at least half of them fresh.  Please avoid sugar, salt, fried food and saturated  fat.    F/up in 1 year or sooner if needed.

## 2024-01-12 ENCOUNTER — OFFICE VISIT (OUTPATIENT)
Dept: ENDOCRINOLOGY | Facility: CLINIC | Age: 49
End: 2024-01-12
Payer: COMMERCIAL

## 2024-01-12 VITALS
HEART RATE: 63 BPM | WEIGHT: 182 LBS | HEIGHT: 66 IN | SYSTOLIC BLOOD PRESSURE: 121 MMHG | BODY MASS INDEX: 29.25 KG/M2 | DIASTOLIC BLOOD PRESSURE: 78 MMHG

## 2024-01-12 DIAGNOSIS — E04.2 MULTINODULAR GOITER: Primary | ICD-10-CM

## 2024-01-12 PROCEDURE — 1036F TOBACCO NON-USER: CPT | Performed by: STUDENT IN AN ORGANIZED HEALTH CARE EDUCATION/TRAINING PROGRAM

## 2024-01-12 PROCEDURE — 99204 OFFICE O/P NEW MOD 45 MIN: CPT | Performed by: STUDENT IN AN ORGANIZED HEALTH CARE EDUCATION/TRAINING PROGRAM

## 2024-01-12 NOTE — PATIENT INSTRUCTIONS
- Agree with ENT - no need for FNA at this time. Can repeat ultrasound in 1 year, which has been ordered.

## 2024-01-12 NOTE — PROGRESS NOTES
Endocrinology  1/12/2024    History of Present Illness   Chelsey Anand is a 48 y.o. year old female with medical history of MNG, here for thyroid.     Diagnosed with goiter man years ago.  7 years ago she had FNA of R thyroid nodule done at [facility]. Results were benign.   Never required thyroid medication.     She has had some choking sensation, and was seen by ENT.   For the thyroid nodule, recommended follow up ultrasound in 1 year.   For her globus sensation, they started PPI. She has been taking it intermittently.     Family history of thyroid disease: Denies    History of radiation to the head/neck/chest: Denies    Biotin use: Denies      Review of Systems   General: Denies fever or chills   Head: Denies headache or vision changes   Neck: Denies tenderness or lumps   Cardiac: Denies chest pain   Respiratory: Denies shortness of breath or cough   GI: Denies abdominal pain, nausea, or vomiting   Extremities: Denies swelling   Skin: Denies rash     Medications     Current Outpatient Medications   Medication Instructions    cholecalciferol (VITAMIN D-3) 125 mcg, oral, Daily    hydrocortisone (Anusol-HC) 2.5 % rectal cream rectal, 4 times daily PRN    multivitamin tablet oral          History      Past Medical History:   Diagnosis Date    Noninfective gastroenteritis and colitis, unspecified     Colitis    Personal history of other diseases of the nervous system and sense organs     History of migraine headaches       Past Surgical History:   Procedure Laterality Date    COLONOSCOPY  11/28/2012    Complete Colonoscopy       Family History   Problem Relation Name Age of Onset    Other (CABG) Father      Diabetes Other      Hypertension Other          Allergies   Allergen Reactions    Penicillins Rash        Social history:   - Social alcohol use   - Denies tobacco use   - Denies recreational drug use   - Lives with  and son - son is Dental student at [employer], daughter at [employer]   - 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        Physical Exam   /78 (BP Location: Right arm, Patient Position: Sitting, BP Cuff Size: Adult)   Pulse 63   Ht 1.676 m (5' 6\")   Wt 82.6 kg (182 lb)   BMI 29.38 kg/m²   General: Well appearing, no acute distress  Heart: Normal rate  Neck: Soft, nontender, no lymphadenopathy, R thyroid nodule palpable   Lungs: Breathing comfortably on room air   Extremities: Warm, no edema  Skin: No rashes      Labs and Imaging     Lab Results   Component Value Date    TSH 3.03 09/15/2023    FREET4 0.97 09/15/2023    T3FREE 3.3 02/06/2018    V7CNWGE 121 09/15/2023    THYROIDPAB <10 02/06/2018     Thyroid ultrasound 2/2016 - This was at time of FNA   COMPARISON: Comparison is made to a previous exam dated 10/30/2013 as   well as 8/2/2013.      RESULT :     The right thyroid lobe is again noted to be enlarged.  The right lobe   measures 5.5 x 1.8 x 2.0 cm and the left 3.5 x 0.9 x 1.4 cm.  The isthmus   is 3 mm thick.  There is inhomogeneous thyroid gland echogenicity.     There is a hyperechoic solid nodule with vascular flow in the medial   portion of the right lobe adjacent to the isthmus currently measuring 1.7   x 0.9 x 1.6 cm (previously measured 1.3 x 0.7 x 1.1 cm).     IMPRESSION :     Hyperechoic solid nodule in the right thyroid lobe has increased in size   from 10/30/2013.     FNA 2016  CONVERTED FINAL DIAGNOSIS A. THYROID, RIGHT LOBE, FINE NEEDLE ASPIRATE Benign.      Consistent with  colloid nodule.           CONVERTED GROSS DESCRIPTION 30cc clear pink tinged CytoLyt with 2 smears   CONVERTED SPECIMENS THYROID, RIGHT LOBE, FINE NEEDLE ASPIRATE   CONVERTED COMPLETE REPORT       Specimen originated from [facility]  Specimen #: [id number]  Submitting Physician: FELIPE GIBBS MD  SPECIMEN SUBMITTED A: THYROID,  RIGHT LOBE, FINE NEEDLE ASPIRATE    ___________________________________________________________________  FINAL  DIAGNOSIS A. THYROID, RIGHT LOBE, FINE NEEDLE ASPIRATE Benign. " Consistent  with colloid nodule.     Penelope Hilario M.D.                 (Electronic  Signature)  ___________________________________________________________________        GROSS DESCRIPTION 30cc clear pink tinged CytoLyt with 2 smears     STAINS  A:  THYROID, RIGHT LOBE, FINE NEEDLE ASPIRATE       SMEARS RECEIVED x 2,  THIN PREP Non-Gyn    Patient ID #: [id number] Date of Report: 6/16/2016 Date  of Procedure: 6/15/2016 Date of Receipt: 6/16/2016 Submitted by: FELIPE GIBBS MD Additional Physician(s): RYDER GORDON MD Location: [facility]  Diagnostic interpretation performed at [facility], [address] [place] [address].   CLIA Number: [id number]         CONVERTED ORDERING PROVIDER Ordering Provider: FELIPE GIBBS   Resulting Agency [facility]   Specimen Collected: 06/15/16 14:52    Performed by: [facility] Last Resulted: 06/16/16 15:14   Received From: [facility]  Result Received: 12/22/23 11:54      STUDY:  US THYROID;  9/15/2023 9:14 am     INDICATION:  thyroid nodules f/up.     COMPARISON:  Thyroid ultrasound 01/14/2022     ACCESSION NUMBER(S):  [id number]     ORDERING CLINICIAN:  NEISHA GARCIA     TECHNIQUE:  Multiple ultrasonographic images of the thyroid gland and surrounding  tissues were obtained.     FINDINGS:  PARENCHYMA:     SIZE:  RIGHT LOBE:  4.7 x 2.1 x 2.2 cm; homogeneous echotexture with normal vascularity.  LEFT LOBE:  3.3 x 0.8 x 1.7 cm; homogeneous echotexture with normal vascularity.  ISTHMUS:  0.3 cm     NODULES: (Please note, assessment and description of nodules is per  TI-RADS criteria. Up to 4 total nodules described, which includes  largest and/or most clinically significant based on morphology.) It  is noted that some spongiform and/or cystic nodules may not be  specifically described and are TR category 1 (benign).     NODULE #: 1.     Location: Right interpolar region  Size: 21 x 10 x 20 mm, previously 20 x 9 x 16 mm.  Composition:  Solid or almost completely  solid (2)  Echogenicity:  Hyperechoic or isoechoic (1)  Shape:  Wider-than-tall (0)  Margin:  Smooth (0)  Echogenic Foci:  None or Large comet-tail artifacts (0)     If present previously:  Significant change in size (>/= 20% diameter increase in at least  two dimensions and minimal increase of 2mm?): No significant change.  Change in features or ACR TI-RADS category: No change.     The total score of this nodule is 3 points, corresponding to a  TI-RADS category  3; (3 points) Mildly suspicious.     NODULE #: 2.     Location: Left inferior pole  Size: 5 x 4 x 5 mm  Composition: mixed cystic and solid (1)  Echogenicity: Hyperechoic or isoechoic (1)  Shape:  Wider-than-tall (0)  Margin:  Smooth (0)  Echogenic Foci: None or Large comet-tail artifacts (0)     If present previously:  Significant change in size (>/= 20% diameter increase in at least  two dimensions and minimal increase of 2mm?): not applicable  Change in features or ACR TI-RADS category: not applicable     The total score of this nodule is 2 points, corresponding to a  TI-RADS category 2; (2 points) not suspicious.     CERVICAL LYMPH NODES:  No enlarged or morphologically abnormal cervical nodes are seen.     IMPRESSION:  1. Stable right 21 mm TIRADS 3 nodule, since 2022. Please see below  for recommendations.    Assessment and Plan   Chelsey Anand is a 48 y.o. year old female with medical history of MNG, here for thyroid. Recently saw ENT as well. R thyroid nodule TIRADS-3 2.1 cm in largest dimension, which is below cutoff for FNA. Prior benign FNA of nodule in 2016. Reviewed imaging with patient. No suspicious features. Agree with repeat ultrasound in 1 year, which has been ordered by ENT.     RTC: As needed     Yahaira Zelaya, DO   Endocrinology

## 2024-03-06 ENCOUNTER — TELEPHONE (OUTPATIENT)
Dept: PRIMARY CARE | Facility: CLINIC | Age: 49
End: 2024-03-06
Payer: COMMERCIAL

## 2024-03-06 DIAGNOSIS — R03.0 ELEVATED BLOOD PRESSURE READING WITHOUT DIAGNOSIS OF HYPERTENSION: Primary | ICD-10-CM

## 2024-03-06 RX ORDER — AMLODIPINE BESYLATE 2.5 MG/1
2.5 TABLET ORAL DAILY
COMMUNITY
End: 2024-03-06 | Stop reason: SDUPTHER

## 2024-03-06 RX ORDER — AMLODIPINE BESYLATE 2.5 MG/1
2.5 TABLET ORAL DAILY
Qty: 30 TABLET | Refills: 0 | Status: SHIPPED | OUTPATIENT
Start: 2024-03-06 | End: 2024-03-29

## 2024-03-06 NOTE — TELEPHONE ENCOUNTER
Patient called in and states her blood pressure was elevated 153/93 and wants to know what she should be doing

## 2024-03-22 ENCOUNTER — APPOINTMENT (OUTPATIENT)
Dept: PRIMARY CARE | Facility: CLINIC | Age: 49
End: 2024-03-22
Payer: COMMERCIAL

## 2024-03-29 ENCOUNTER — OFFICE VISIT (OUTPATIENT)
Dept: PRIMARY CARE | Facility: CLINIC | Age: 49
End: 2024-03-29
Payer: COMMERCIAL

## 2024-03-29 VITALS
TEMPERATURE: 98 F | BODY MASS INDEX: 29.7 KG/M2 | SYSTOLIC BLOOD PRESSURE: 128 MMHG | HEIGHT: 66 IN | OXYGEN SATURATION: 98 % | RESPIRATION RATE: 16 BRPM | HEART RATE: 77 BPM | DIASTOLIC BLOOD PRESSURE: 80 MMHG | WEIGHT: 184.8 LBS

## 2024-03-29 DIAGNOSIS — E66.09 CLASS 1 OBESITY DUE TO EXCESS CALORIES WITHOUT SERIOUS COMORBIDITY WITH BODY MASS INDEX (BMI) OF 30.0 TO 30.9 IN ADULT: Primary | ICD-10-CM

## 2024-03-29 DIAGNOSIS — E04.9 GOITER: ICD-10-CM

## 2024-03-29 DIAGNOSIS — R03.0 ELEVATED BLOOD PRESSURE READING WITHOUT DIAGNOSIS OF HYPERTENSION: ICD-10-CM

## 2024-03-29 DIAGNOSIS — E78.5 DYSLIPIDEMIA: ICD-10-CM

## 2024-03-29 PROBLEM — E66.811 CLASS 1 OBESITY DUE TO EXCESS CALORIES WITHOUT SERIOUS COMORBIDITY WITH BODY MASS INDEX (BMI) OF 30.0 TO 30.9 IN ADULT: Status: ACTIVE | Noted: 2024-03-29

## 2024-03-29 PROCEDURE — 99215 OFFICE O/P EST HI 40 MIN: CPT | Performed by: INTERNAL MEDICINE

## 2024-03-29 PROCEDURE — 1036F TOBACCO NON-USER: CPT | Performed by: INTERNAL MEDICINE

## 2024-03-29 PROCEDURE — 3008F BODY MASS INDEX DOCD: CPT | Performed by: INTERNAL MEDICINE

## 2024-03-29 RX ORDER — SEMAGLUTIDE 0.25 MG/.5ML
0.25 INJECTION, SOLUTION SUBCUTANEOUS
Qty: 2 ML | Refills: 0 | Status: SHIPPED | OUTPATIENT
Start: 2024-03-29 | End: 2024-04-20

## 2024-03-29 ASSESSMENT — ENCOUNTER SYMPTOMS
POLYPHAGIA: 0
NEUROLOGICAL NEGATIVE: 1
ARTHRALGIAS: 0
SORE THROAT: 0
ALLERGIC/IMMUNOLOGIC NEGATIVE: 1
NAUSEA: 0
MUSCULOSKELETAL NEGATIVE: 1
NECK PAIN: 0
CHEST TIGHTNESS: 0
GASTROINTESTINAL NEGATIVE: 1
BRUISES/BLEEDS EASILY: 0
SINUS PAIN: 0
DIZZINESS: 0
POLYDIPSIA: 0
EYES NEGATIVE: 1
SPEECH DIFFICULTY: 0
LIGHT-HEADEDNESS: 0
HEMATOLOGIC/LYMPHATIC NEGATIVE: 1
WEAKNESS: 0
BACK PAIN: 0
RESPIRATORY NEGATIVE: 1
ACTIVITY CHANGE: 0
BLOOD IN STOOL: 0
CARDIOVASCULAR NEGATIVE: 1
NECK STIFFNESS: 0
CONSTIPATION: 0
JOINT SWELLING: 0
ENDOCRINE NEGATIVE: 1
SINUS PRESSURE: 0
UNEXPECTED WEIGHT CHANGE: 1
EYE REDNESS: 0
PALPITATIONS: 0
COUGH: 0
AGITATION: 0
FREQUENCY: 0
MYALGIAS: 0
EYE DISCHARGE: 0
TREMORS: 0
ABDOMINAL PAIN: 0
VOMITING: 0
STRIDOR: 0
PSYCHIATRIC NEGATIVE: 1
HEADACHES: 0
WHEEZING: 0
NERVOUS/ANXIOUS: 0
FLANK PAIN: 0
CONFUSION: 0
SLEEP DISTURBANCE: 0
EYE PAIN: 0
DYSURIA: 0
WOUND: 0
FACIAL ASYMMETRY: 0
DIFFICULTY URINATING: 0
APPETITE CHANGE: 0
SEIZURES: 0
HALLUCINATIONS: 0
COLOR CHANGE: 0
VOICE CHANGE: 0
SHORTNESS OF BREATH: 0
DIARRHEA: 0
NUMBNESS: 0
TROUBLE SWALLOWING: 0
ADENOPATHY: 0

## 2024-03-29 ASSESSMENT — PATIENT HEALTH QUESTIONNAIRE - PHQ9
2. FEELING DOWN, DEPRESSED OR HOPELESS: NOT AT ALL
SUM OF ALL RESPONSES TO PHQ9 QUESTIONS 1 AND 2: 0
1. LITTLE INTEREST OR PLEASURE IN DOING THINGS: NOT AT ALL

## 2024-03-29 NOTE — PROGRESS NOTES
Subjective   Patient ID: Chelsey Anand is a 48 y.o. female who presents for Follow-up (Patient is here today due to BP follow up started on medication).  HPI    Patient has been feeling pretty good, her BP has been pretty well controlled without medications.    Patient contacted me about 3 weeks ago with concern about elevated BP which she noted at home on several occasions (around and above 153/93).  Both parents have HTN.  I advised low salt diet and amlodipine 2.5 mg daily, patient never started medication, she continued to monitor BP which went down, most of recent home readings below 140/90.    We reviewed and discussed details of recent blood work: CBC, CMP, TSH, Lipid panel, Vit D done in 2023.  Results within acceptable range except mildly elevated cholesterol.    Patient has been having difficulties losing weight, she has tried different diets over last several months, has been exercising regularly but her weight has been going up.    Concerned about elevated cholesterol and BP, fam h/o DM, HTN, HLD.    We discussed treatment options for weight loss. Patient tried but so far has not been successful with diet and exercise for weight loss.    Patient  understands that healthy low calorie diet (1500 melchor) and exercise at least 150 min weekly is recommended  in addition to pharmacological treatment.  Patient agreed to try semaglutide.  We discussed contraindications including medullary thyroid ca, and Multiple Neoplasia syndrome type 2.  Also possible side effects discussed: nausea, anaphylaxis, angioedema, pancreatitis, gallbladder disease  Patient will let me know if any any issues arise after injections started.    Her 75 yo mother who has diabetes recently started semaglutide and has been tolerating it well so far.  Patient is aware that because she does not have diabetes medication may not be covered by insurance, she considers paying out of the pocket price.  She had questions regarding medication  administration/injections, I showed her sample of pen used for injections and explained basics of administration.    We discussed consulting [facility] pharmacist to assist with medication administration, advancing the dose and monitoring treatment. Patient agreed.     Had several questions regarding weight loss diets.  We discussed this subject at length, low carbohydrate and low calorie diet advised with goal of 1500 melchor/day and 50g carbohydrates per day.     Patient follows with endocrinologist regarding right thyroid nodule. Underwent biopsy, results were benign.     Review of Systems   Constitutional:  Positive for unexpected weight change. Negative for activity change and appetite change.   HENT: Negative.  Negative for congestion, ear discharge, ear pain, hearing loss, mouth sores, nosebleeds, sinus pressure, sinus pain, sore throat, trouble swallowing and voice change.    Eyes: Negative.  Negative for pain, discharge, redness and visual disturbance.   Respiratory: Negative.  Negative for cough, chest tightness, shortness of breath, wheezing and stridor.    Cardiovascular: Negative.  Negative for chest pain, palpitations and leg swelling.   Gastrointestinal: Negative.  Negative for abdominal pain, blood in stool, constipation, diarrhea, nausea and vomiting.   Endocrine: Negative.  Negative for polydipsia, polyphagia and polyuria.   Genitourinary: Negative.  Negative for difficulty urinating, dysuria, flank pain, frequency and urgency.   Musculoskeletal: Negative.  Negative for arthralgias, back pain, gait problem, joint swelling, myalgias, neck pain and neck stiffness.   Skin: Negative.  Negative for color change, rash and wound.   Allergic/Immunologic: Negative.  Negative for environmental allergies, food allergies and immunocompromised state.   Neurological: Negative.  Negative for dizziness, tremors, seizures, syncope, facial asymmetry, speech difficulty, weakness, light-headedness, numbness and headaches.  "  Hematological: Negative.  Negative for adenopathy. Does not bruise/bleed easily.   Psychiatric/Behavioral: Negative.  Negative for agitation, behavioral problems, confusion, hallucinations, sleep disturbance and suicidal ideas. The patient is not nervous/anxious.    All other systems reviewed and are negative.      Objective     Review of systems was performed and all systems were negative except what in HPI    /80 (BP Location: Left arm, Patient Position: Sitting, BP Cuff Size: Adult)   Pulse 77   Temp 36.7 °C (98 °F) (Temporal)   Resp 16   Ht 1.664 m (5' 5.5\")   Wt 83.8 kg (184 lb 12.8 oz)   SpO2 98%   BMI 30.28 kg/m²        Physical Exam  Vitals and nursing note reviewed.   Constitutional:       General: She is not in acute distress.     Appearance: Normal appearance.   HENT:      Head: Normocephalic and atraumatic.      Right Ear: External ear normal.      Left Ear: External ear normal.      Nose: Nose normal. No congestion or rhinorrhea.   Eyes:      General:         Right eye: No discharge.         Left eye: No discharge.      Extraocular Movements: Extraocular movements intact.      Conjunctiva/sclera: Conjunctivae normal.      Pupils: Pupils are equal, round, and reactive to light.   Cardiovascular:      Rate and Rhythm: Normal rate and regular rhythm.      Pulses: Normal pulses.      Heart sounds: Normal heart sounds. No murmur heard.     No friction rub. No gallop.   Pulmonary:      Effort: Pulmonary effort is normal. No respiratory distress.      Breath sounds: Normal breath sounds. No stridor. No wheezing, rhonchi or rales.   Chest:      Chest wall: No tenderness.   Abdominal:      General: Bowel sounds are normal.      Palpations: Abdomen is soft. There is no mass.      Tenderness: There is no abdominal tenderness. There is no guarding or rebound.   Musculoskeletal:         General: No swelling or deformity. Normal range of motion.      Cervical back: Normal range of motion and neck " supple. No rigidity or tenderness.      Right lower leg: No edema.      Left lower leg: No edema.   Lymphadenopathy:      Cervical: No cervical adenopathy.   Skin:     General: Skin is warm and dry.      Coloration: Skin is not jaundiced.      Findings: No bruising or erythema.   Neurological:      General: No focal deficit present.      Mental Status: She is alert and oriented to person, place, and time. Mental status is at baseline.      Cranial Nerves: No cranial nerve deficit.      Motor: No weakness.      Coordination: Coordination normal.      Gait: Gait normal.   Psychiatric:         Mood and Affect: Mood normal.         Behavior: Behavior normal.         Thought Content: Thought content normal.         Judgment: Judgment normal.         Assessment/Plan   Problem List Items Addressed This Visit             ICD-10-CM       Cardiac and Vasculature    Dyslipidemia E78.5     Low cholesterol and low carbohydrate diet is advised.   Weight loss advised.         Elevated blood pressure reading without diagnosis of hypertension R03.0     DASH diet and weight loss advised.            Endocrine/Metabolic    Goiter E04.9     Clinically stable. F/up with  endocrinologist.         Class 1 obesity due to excess calories without serious comorbidity with body mass index (BMI) of 30.0 to 30.9 in adult - Primary E66.09, Z68.30     Weight loss is advised. Target BMI: 25. Please follow low carbohydrate diet and exercise at least 150 minutes weekly.  Nutritional consultation is available, please let me know if interested.   Start Semaglutide for weight loss as discussed.         Relevant Medications    semaglutide, weight loss, (Wegovy) 0.25 mg/0.5 mL pen injector    Other Relevant Orders    Follow Up In [facility] - Pharmacy     It was a pleasure to see you today.  I would like to remind you about importance of a healthy lifestyle in order to improve your well-being and live longer.  Try to engage in physical  activities for at least 150 minutes per week.  Eat about 10 servings of fruits and vegetables daily. My advice is 2 servings of fruits and 8 servings of vegetables.  For vegetables choose at least half of them green and at least half of them fresh.  Please avoid sugar, salt, fried food and saturated fat.    I spent a total of 40 minutes on the date of service for follow up visit, which included preparing to see the patient, face-to-face patient care, completing clinical documentation, obtaining and/or reviewing separately obtained history, performing a medically appropriate examination, counseling and educating the patient/family/caregiver, ordering medications, tests, or procedures, communicating with other health care providers (not separately reported), independently interpreting results (not separately reported), communicating results to the patient/family/caregiver, and care coordination (not separately reported).    F/up in 3 months or sooner if needed.

## 2024-03-30 NOTE — ASSESSMENT & PLAN NOTE
Weight loss is advised. Target BMI: 25. Please follow low carbohydrate diet and exercise at least 150 minutes weekly.  Nutritional consultation is available, please let me know if interested.   Start Semaglutide for weight loss as discussed.

## 2024-05-03 ENCOUNTER — LAB (OUTPATIENT)
Dept: LAB | Facility: LAB | Age: 49
End: 2024-05-03
Payer: COMMERCIAL

## 2024-05-03 DIAGNOSIS — Z00.00 HEALTHCARE MAINTENANCE: ICD-10-CM

## 2024-05-03 DIAGNOSIS — E55.9 VITAMIN D DEFICIENCY: ICD-10-CM

## 2024-05-03 DIAGNOSIS — E04.9 GOITER: ICD-10-CM

## 2024-05-03 LAB
25(OH)D3 SERPL-MCNC: 29 NG/ML (ref 30–100)
APPEARANCE UR: ABNORMAL
BILIRUB UR STRIP.AUTO-MCNC: NEGATIVE MG/DL
COLOR UR: ABNORMAL
GLUCOSE UR STRIP.AUTO-MCNC: NORMAL MG/DL
KETONES UR STRIP.AUTO-MCNC: NEGATIVE MG/DL
LEUKOCYTE ESTERASE UR QL STRIP.AUTO: NEGATIVE
MUCOUS THREADS #/AREA URNS AUTO: ABNORMAL /LPF
NITRITE UR QL STRIP.AUTO: NEGATIVE
PH UR STRIP.AUTO: 5.5 [PH]
PROT UR STRIP.AUTO-MCNC: NEGATIVE MG/DL
RBC # UR STRIP.AUTO: ABNORMAL /UL
RBC #/AREA URNS AUTO: ABNORMAL /HPF
SP GR UR STRIP.AUTO: 1.02
SQUAMOUS #/AREA URNS AUTO: ABNORMAL /HPF
TSH SERPL-ACNC: 2.45 MIU/L (ref 0.44–3.98)
UROBILINOGEN UR STRIP.AUTO-MCNC: NORMAL MG/DL
WBC #/AREA URNS AUTO: ABNORMAL /HPF
YEAST BUDDING #/AREA UR COMP ASSIST: PRESENT /HPF

## 2024-05-03 PROCEDURE — 84443 ASSAY THYROID STIM HORMONE: CPT

## 2024-05-03 PROCEDURE — 80061 LIPID PANEL: CPT

## 2024-05-03 PROCEDURE — 80053 COMPREHEN METABOLIC PANEL: CPT

## 2024-05-03 PROCEDURE — 82306 VITAMIN D 25 HYDROXY: CPT

## 2024-05-03 PROCEDURE — 36415 COLL VENOUS BLD VENIPUNCTURE: CPT

## 2024-05-03 PROCEDURE — 85027 COMPLETE CBC AUTOMATED: CPT

## 2024-05-03 PROCEDURE — 81001 URINALYSIS AUTO W/SCOPE: CPT

## 2024-05-04 LAB
ALBUMIN SERPL BCP-MCNC: 4.2 G/DL (ref 3.4–5)
ALP SERPL-CCNC: 45 U/L (ref 33–110)
ALT SERPL W P-5'-P-CCNC: 11 U/L (ref 7–45)
ANION GAP SERPL CALC-SCNC: 11 MMOL/L (ref 10–20)
AST SERPL W P-5'-P-CCNC: 15 U/L (ref 9–39)
BILIRUB SERPL-MCNC: 0.6 MG/DL (ref 0–1.2)
BUN SERPL-MCNC: 11 MG/DL (ref 6–23)
CALCIUM SERPL-MCNC: 9.1 MG/DL (ref 8.6–10.6)
CHLORIDE SERPL-SCNC: 104 MMOL/L (ref 98–107)
CHOLEST SERPL-MCNC: 189 MG/DL (ref 0–199)
CHOLESTEROL/HDL RATIO: 3
CO2 SERPL-SCNC: 28 MMOL/L (ref 21–32)
CREAT SERPL-MCNC: 0.94 MG/DL (ref 0.5–1.05)
EGFRCR SERPLBLD CKD-EPI 2021: 75 ML/MIN/1.73M*2
ERYTHROCYTE [DISTWIDTH] IN BLOOD BY AUTOMATED COUNT: 13.5 % (ref 11.5–14.5)
GLUCOSE SERPL-MCNC: 83 MG/DL (ref 74–99)
HCT VFR BLD AUTO: 43.8 % (ref 36–46)
HDLC SERPL-MCNC: 63.9 MG/DL
HGB BLD-MCNC: 13.8 G/DL (ref 12–16)
LDLC SERPL CALC-MCNC: 115 MG/DL
MCH RBC QN AUTO: 27.8 PG (ref 26–34)
MCHC RBC AUTO-ENTMCNC: 31.5 G/DL (ref 32–36)
MCV RBC AUTO: 88 FL (ref 80–100)
NON HDL CHOLESTEROL: 125 MG/DL (ref 0–149)
NRBC BLD-RTO: 0 /100 WBCS (ref 0–0)
PLATELET # BLD AUTO: 251 X10*3/UL (ref 150–450)
POTASSIUM SERPL-SCNC: 4.4 MMOL/L (ref 3.5–5.3)
PROT SERPL-MCNC: 6.6 G/DL (ref 6.4–8.2)
RBC # BLD AUTO: 4.97 X10*6/UL (ref 4–5.2)
SODIUM SERPL-SCNC: 139 MMOL/L (ref 136–145)
TRIGL SERPL-MCNC: 51 MG/DL (ref 0–149)
VLDL: 10 MG/DL (ref 0–40)
WBC # BLD AUTO: 6 X10*3/UL (ref 4.4–11.3)

## 2024-05-04 NOTE — RESULT ENCOUNTER NOTE
Your recent lab work was acceptable. All results may not be within normal range but they are not clinically significant at this time and do not require change in your therapy. We will discuss details during your next office visit. Please keep your next appointment as scheduled. Dr. Au

## 2024-05-13 ENCOUNTER — APPOINTMENT (OUTPATIENT)
Dept: OTOLARYNGOLOGY | Facility: CLINIC | Age: 49
End: 2024-05-13
Payer: COMMERCIAL

## 2024-06-21 ENCOUNTER — APPOINTMENT (OUTPATIENT)
Dept: PRIMARY CARE | Facility: CLINIC | Age: 49
End: 2024-06-21
Payer: COMMERCIAL

## 2024-07-23 ENCOUNTER — APPOINTMENT (OUTPATIENT)
Dept: PRIMARY CARE | Facility: CLINIC | Age: 49
End: 2024-07-23
Payer: COMMERCIAL

## 2024-07-23 VITALS
WEIGHT: 184 LBS | RESPIRATION RATE: 16 BRPM | BODY MASS INDEX: 30.15 KG/M2 | OXYGEN SATURATION: 98 % | TEMPERATURE: 98 F | SYSTOLIC BLOOD PRESSURE: 132 MMHG | HEART RATE: 78 BPM | DIASTOLIC BLOOD PRESSURE: 72 MMHG

## 2024-07-23 DIAGNOSIS — K64.4 EXTERNAL HEMORRHOID: ICD-10-CM

## 2024-07-23 DIAGNOSIS — H53.9 CHANGE IN VISION: ICD-10-CM

## 2024-07-23 DIAGNOSIS — H57.11 EYE PAIN, RIGHT: Primary | ICD-10-CM

## 2024-07-23 PROCEDURE — 1036F TOBACCO NON-USER: CPT | Performed by: INTERNAL MEDICINE

## 2024-07-23 PROCEDURE — 99213 OFFICE O/P EST LOW 20 MIN: CPT | Performed by: INTERNAL MEDICINE

## 2024-07-23 ASSESSMENT — ENCOUNTER SYMPTOMS
HEADACHES: 0
NEUROLOGICAL NEGATIVE: 1
GASTROINTESTINAL NEGATIVE: 1
CONFUSION: 0
ENDOCRINE NEGATIVE: 1
SLEEP DISTURBANCE: 0
WHEEZING: 0
ACTIVITY CHANGE: 0
COUGH: 0
DIZZINESS: 0
BRUISES/BLEEDS EASILY: 0
EYE DISCHARGE: 0
FLANK PAIN: 0
MYALGIAS: 0
PALPITATIONS: 0
POLYPHAGIA: 0
UNEXPECTED WEIGHT CHANGE: 0
APPETITE CHANGE: 0
NECK PAIN: 0
SHORTNESS OF BREATH: 0
NERVOUS/ANXIOUS: 0
SPEECH DIFFICULTY: 0
SINUS PRESSURE: 0
ALLERGIC/IMMUNOLOGIC NEGATIVE: 1
SEIZURES: 0
DIFFICULTY URINATING: 0
POLYDIPSIA: 0
VOICE CHANGE: 0
CHEST TIGHTNESS: 0
WOUND: 0
EYE PAIN: 0
HEMATOLOGIC/LYMPHATIC NEGATIVE: 1
ARTHRALGIAS: 0
SORE THROAT: 0
NECK STIFFNESS: 0
DIARRHEA: 0
NUMBNESS: 0
CONSTIPATION: 0
SINUS PAIN: 0
ADENOPATHY: 0
TREMORS: 0
EYE REDNESS: 0
PSYCHIATRIC NEGATIVE: 1
HALLUCINATIONS: 0
DYSURIA: 0
RESPIRATORY NEGATIVE: 1
BACK PAIN: 0
AGITATION: 0
TROUBLE SWALLOWING: 0
CARDIOVASCULAR NEGATIVE: 1
VOMITING: 0
FACIAL ASYMMETRY: 0
BLOOD IN STOOL: 0
NAUSEA: 0
FREQUENCY: 0
JOINT SWELLING: 0
ABDOMINAL PAIN: 0
LIGHT-HEADEDNESS: 0
STRIDOR: 0
MUSCULOSKELETAL NEGATIVE: 1
COLOR CHANGE: 0
CONSTITUTIONAL NEGATIVE: 1
WEAKNESS: 0

## 2024-07-23 NOTE — PROGRESS NOTES
Subjective   Patient ID: Chelsey Anand is a 49 y.o. female who presents for pressure in the eye (Pt is here due to pressure in the Right eye/Pt came back 07/18/2024 incident occurred July 02/2024/Hemorrhoids issues).  HPI    Patient has been feeling pretty good and has been complaint with prescribed medications.    We reviewed and discussed details of recent blood work: CBC, CMP, TSH, Lipid panel,  Vit D done in May 024.  Results within acceptable range.    She recently returned from vacations in [place].  Has been c/o right eye pressure, saw optometrist at [facility] and was told that no abnormalities were found.  She feels that her  vision has been disturbed.      Another complaint is exacerbation of rectal discomfort due to hemorrhoids.  She has been using rectal creams and suppositories without much improvement.  Will refer to Rectal surgeon.    Patient follows with endocrinologist regarding right thyroid nodule. Underwent biopsy, results were benign.       Review of Systems   Constitutional: Negative.  Negative for activity change, appetite change and unexpected weight change.   HENT: Negative.  Negative for congestion, ear discharge, ear pain, hearing loss, mouth sores, nosebleeds, sinus pressure, sinus pain, sore throat, trouble swallowing and voice change.    Eyes:  Positive for visual disturbance. Negative for pain, discharge and redness.   Respiratory: Negative.  Negative for cough, chest tightness, shortness of breath, wheezing and stridor.    Cardiovascular: Negative.  Negative for chest pain, palpitations and leg swelling.   Gastrointestinal: Negative.  Negative for abdominal pain, blood in stool, constipation, diarrhea, nausea and vomiting.   Endocrine: Negative.  Negative for polydipsia, polyphagia and polyuria.   Genitourinary: Negative.  Negative for difficulty urinating, dysuria, flank pain, frequency and urgency.   Musculoskeletal: Negative.  Negative for arthralgias, back pain, gait problem,  joint swelling, myalgias, neck pain and neck stiffness.   Skin: Negative.  Negative for color change, rash and wound.   Allergic/Immunologic: Negative.  Negative for environmental allergies, food allergies and immunocompromised state.   Neurological: Negative.  Negative for dizziness, tremors, seizures, syncope, facial asymmetry, speech difficulty, weakness, light-headedness, numbness and headaches.   Hematological: Negative.  Negative for adenopathy. Does not bruise/bleed easily.   Psychiatric/Behavioral: Negative.  Negative for agitation, behavioral problems, confusion, hallucinations, sleep disturbance and suicidal ideas. The patient is not nervous/anxious.    All other systems reviewed and are negative.      Objective     Review of systems was performed and all systems were negative except what in HPI    /72 (BP Location: Left arm, Patient Position: Sitting, BP Cuff Size: Adult)   Pulse 78   Temp 36.7 °C (98 °F) (Temporal)   Resp 16   Wt 83.5 kg (184 lb)   SpO2 98%   BMI 30.15 kg/m²    Physical Exam  Vitals and nursing note reviewed.   Constitutional:       General: She is not in acute distress.     Appearance: Normal appearance.   HENT:      Head: Normocephalic and atraumatic.      Right Ear: External ear normal.      Left Ear: External ear normal.      Nose: Nose normal. No congestion or rhinorrhea.   Eyes:      General:         Right eye: No discharge.         Left eye: No discharge.      Extraocular Movements: Extraocular movements intact.      Conjunctiva/sclera: Conjunctivae normal.      Pupils: Pupils are equal, round, and reactive to light.   Cardiovascular:      Rate and Rhythm: Normal rate and regular rhythm.      Pulses: Normal pulses.      Heart sounds: Normal heart sounds. No murmur heard.     No friction rub. No gallop.   Pulmonary:      Effort: Pulmonary effort is normal. No respiratory distress.      Breath sounds: Normal breath sounds. No stridor. No wheezing, rhonchi or rales.    Chest:      Chest wall: No tenderness.   Abdominal:      General: Bowel sounds are normal.      Palpations: Abdomen is soft. There is no mass.      Tenderness: There is no abdominal tenderness. There is no guarding or rebound.   Musculoskeletal:         General: No swelling or deformity. Normal range of motion.      Cervical back: Normal range of motion and neck supple. No rigidity or tenderness.      Right lower leg: No edema.      Left lower leg: No edema.   Lymphadenopathy:      Cervical: No cervical adenopathy.   Skin:     General: Skin is warm and dry.      Coloration: Skin is not jaundiced.      Findings: No bruising or erythema.   Neurological:      General: No focal deficit present.      Mental Status: She is alert and oriented to person, place, and time. Mental status is at baseline.      Cranial Nerves: No cranial nerve deficit.      Motor: No weakness.      Coordination: Coordination normal.      Gait: Gait normal.   Psychiatric:         Mood and Affect: Mood normal.         Behavior: Behavior normal.         Thought Content: Thought content normal.         Judgment: Judgment normal.         Assessment/Plan   Problem List Items Addressed This Visit             ICD-10-CM       Gastrointestinal and Abdominal    External hemorrhoid K64.4    Relevant Orders    Referral to Colorectal Surgery     Other Visit Diagnoses         Codes    Eye pain, right    -  Primary H57.11    Relevant Orders    Referral to Ophthalmology    Change in vision     H53.9    Relevant Orders    Referral to Ophthalmology          It was a pleasure to see you today.  I would like to remind you about importance of a healthy lifestyle in order to improve your well-being and live longer.  Try to engage in physical activities for at least 150 minutes per week.  Eat about 10 servings of fruits and vegetables daily. My advice is 2 servings of fruits and 8 servings of vegetables.  For vegetables choose at least half of them green and at least  half of them fresh.  Please avoid sugar, salt, fried food and saturated fat.    I spent a total of 26 minutes on the date of service for follow up visit, which included preparing to see the patient, face-to-face patient care, completing clinical documentation, obtaining and/or reviewing separately obtained history, performing a medically appropriate examination, counseling and educating the patient/family/caregiver, ordering medications, tests, or procedures, communicating with other health care providers (not separately reported), independently interpreting results (not separately reported), communicating results to the patient/family/caregiver, and care coordination (not separately reported).    F/up as scheduled or sooner if needed.

## 2024-07-24 NOTE — ASSESSMENT & PLAN NOTE
Avoid constipation. Use preparation H as needed, Hydrocortisone cream if you experience rectal itching and preparation H not effective.   Consult rectal surgery.

## 2024-08-01 ENCOUNTER — OFFICE VISIT (OUTPATIENT)
Dept: SURGERY | Facility: CLINIC | Age: 49
End: 2024-08-01
Payer: COMMERCIAL

## 2024-08-01 VITALS
BODY MASS INDEX: 30.42 KG/M2 | WEIGHT: 182.6 LBS | HEART RATE: 75 BPM | SYSTOLIC BLOOD PRESSURE: 112 MMHG | HEIGHT: 65 IN | DIASTOLIC BLOOD PRESSURE: 75 MMHG

## 2024-08-01 DIAGNOSIS — K64.4 EXTERNAL HEMORRHOID: ICD-10-CM

## 2024-08-01 DIAGNOSIS — K64.8: Primary | ICD-10-CM

## 2024-08-01 DIAGNOSIS — R19.7 DIARRHEA, UNSPECIFIED TYPE: ICD-10-CM

## 2024-08-01 PROCEDURE — 46600 DIAGNOSTIC ANOSCOPY SPX: CPT | Performed by: NURSE PRACTITIONER

## 2024-08-01 PROCEDURE — 99213 OFFICE O/P EST LOW 20 MIN: CPT | Performed by: NURSE PRACTITIONER

## 2024-08-01 RX ORDER — HYDROCORTISONE ACETATE 25 MG/1
25 SUPPOSITORY RECTAL 2 TIMES DAILY
Qty: 28 SUPPOSITORY | Refills: 0 | Status: SHIPPED | OUTPATIENT
Start: 2024-08-01 | End: 2024-08-15

## 2024-08-01 NOTE — PROGRESS NOTES
History Of Present Illness  Chelsey Anand is a 49 y.o. female presenting with hemorrhoidal issues.     She has external hemorrhoids that have been bothering her over the past year.  She has no pain or bleeding to the hemorrhoids, they just bother her because they will increase and decrease in size.  She will have 2 soft-loose BM's every day with no straining.  She has diarrhea most of the time.   She has had the work-up for the diarrhea and it has always been negative.  She does not sit long on the toilet to have a BM.  No c/o any accidents or leakage of stool.  No hx of any perianal surgeries.  She has had 2 vaginal births with no tears.    Colonoscopy 2022 negative      Past Medical History  She has a past medical history of Noninfective gastroenteritis and colitis, unspecified and Personal history of other diseases of the nervous system and sense organs.    Surgical History  She has a past surgical history that includes Colonoscopy (11/28/2012).     Social History  She reports that she has never smoked. She has never been exposed to tobacco smoke. She has never used smokeless tobacco. She reports that she does not drink alcohol and does not use drugs.    Family History  Family History   Problem Relation Name Age of Onset    Other (CABG) Father      Diabetes Other      Hypertension Other          Allergies  Penicillins    Review of Systems   All other systems reviewed and are negative.       Physical Exam  Constitutional:       Appearance: Normal appearance.   HENT:      Head: Normocephalic and atraumatic.   Pulmonary:      Effort: Pulmonary effort is normal.   Musculoskeletal:         General: Normal range of motion.   Skin:     General: Skin is warm and dry.   Neurological:      General: No focal deficit present.      Mental Status: She is alert and oriented to person, place, and time.   Psychiatric:         Mood and Affect: Mood normal.         Behavior: Behavior normal.          Anoscopy    Date/Time:  8/1/2024 9:18 AM    Performed by: ROJELIO Rollins  Authorized by: ROJELIO Rollins    Consent:     Consent obtained:  Verbal    Consent given by:  Patient    Risks, benefits, and alternatives were discussed: yes    Universal protocol:     Procedure explained and questions answered to patient or proxy's satisfaction: yes      Patient identity confirmed:  Verbally with patient  Post-procedure details:     Procedure completion:  Tolerated  Comments:      She has a very tiny non-inflamed anal skin tag in the left anterior lateral position.  Good tone on WAYNE, with discomfort.  On anoscopy, looking in 360 degrees, she has non-inflamed internal hemorrhoids with a superficial ulcerated like area in the left anterior lateral position, with pain.  No active bleeding.    Last Recorded Vitals  /75   Pulse 75   Wt 82.8 kg (182 lb 9.6 oz)        Assessment/Plan   Chelsey has a small superficial ulcer on the internal hemorrhoid in the left anterior lateral position.  She will start using Hydrocortisone suppositories BID for 2 weeks.  She will start taking Metamucil 1-2x/day to help bulk up her stools.  She will start taking 1/2 tablet of Imodium daily to keep her stools more bulked.  She will call with any issues and will follow up in 3-4 weeks if not better.       ROJELIO Rollins

## 2024-09-19 DIAGNOSIS — K64.8: Primary | ICD-10-CM

## 2024-09-19 RX ORDER — HYDROCORTISONE ACETATE 25 MG/1
25 SUPPOSITORY RECTAL 2 TIMES DAILY
Qty: 28 SUPPOSITORY | Refills: 0 | Status: SHIPPED | OUTPATIENT
Start: 2024-09-19 | End: 2024-10-03

## 2024-11-19 ENCOUNTER — APPOINTMENT (OUTPATIENT)
Dept: PRIMARY CARE | Facility: CLINIC | Age: 49
End: 2024-11-19
Payer: COMMERCIAL

## 2024-11-19 VITALS
DIASTOLIC BLOOD PRESSURE: 60 MMHG | HEIGHT: 65 IN | WEIGHT: 188.6 LBS | HEART RATE: 78 BPM | TEMPERATURE: 98 F | RESPIRATION RATE: 16 BRPM | SYSTOLIC BLOOD PRESSURE: 132 MMHG | BODY MASS INDEX: 31.42 KG/M2 | OXYGEN SATURATION: 99 %

## 2024-11-19 DIAGNOSIS — M25.9 LESION OF WRIST: Primary | ICD-10-CM

## 2024-11-19 DIAGNOSIS — M79.601 RIGHT ARM PAIN: ICD-10-CM

## 2024-11-19 PROCEDURE — 3008F BODY MASS INDEX DOCD: CPT | Performed by: INTERNAL MEDICINE

## 2024-11-19 PROCEDURE — 99213 OFFICE O/P EST LOW 20 MIN: CPT | Performed by: INTERNAL MEDICINE

## 2024-11-19 PROCEDURE — 1036F TOBACCO NON-USER: CPT | Performed by: INTERNAL MEDICINE

## 2024-11-19 ASSESSMENT — ENCOUNTER SYMPTOMS
SORE THROAT: 0
BLOOD IN STOOL: 0
GASTROINTESTINAL NEGATIVE: 1
FREQUENCY: 0
EYE PAIN: 0
LIGHT-HEADEDNESS: 0
BRUISES/BLEEDS EASILY: 0
SINUS PAIN: 0
CONSTITUTIONAL NEGATIVE: 1
FACIAL ASYMMETRY: 0
BACK PAIN: 0
AGITATION: 0
ABDOMINAL PAIN: 0
ARTHRALGIAS: 0
DYSURIA: 0
DIARRHEA: 0
CONFUSION: 0
NAUSEA: 0
UNEXPECTED WEIGHT CHANGE: 0
SEIZURES: 0
TROUBLE SWALLOWING: 0
HEADACHES: 0
FLANK PAIN: 0
CONSTIPATION: 0
NECK STIFFNESS: 0
PSYCHIATRIC NEGATIVE: 1
DIZZINESS: 0
EYES NEGATIVE: 1
APPETITE CHANGE: 0
SPEECH DIFFICULTY: 0
CARDIOVASCULAR NEGATIVE: 1
POLYPHAGIA: 0
SLEEP DISTURBANCE: 0
COLOR CHANGE: 0
ACTIVITY CHANGE: 0
VOICE CHANGE: 0
ENDOCRINE NEGATIVE: 1
ADENOPATHY: 0
TREMORS: 0
EYE REDNESS: 0
HEMATOLOGIC/LYMPHATIC NEGATIVE: 1
EYE DISCHARGE: 0
NUMBNESS: 0
WEAKNESS: 0
COUGH: 0
POLYDIPSIA: 0
STRIDOR: 0
SHORTNESS OF BREATH: 0
NERVOUS/ANXIOUS: 0
MYALGIAS: 0
PALPITATIONS: 0
HALLUCINATIONS: 0
NECK PAIN: 0
DIFFICULTY URINATING: 0
VOMITING: 0
MUSCULOSKELETAL NEGATIVE: 1
SINUS PRESSURE: 0
WHEEZING: 0
CHEST TIGHTNESS: 0
ALLERGIC/IMMUNOLOGIC NEGATIVE: 1
WOUND: 0
JOINT SWELLING: 0
RESPIRATORY NEGATIVE: 1
NEUROLOGICAL NEGATIVE: 1

## 2024-11-19 NOTE — PROGRESS NOTES
Subjective   Patient ID: Chelsey Anand is a 49 y.o. female who presents for cyst on the right wrist  (Pt is here due to a cyst on the right wrist ).  HPI    Patient has been feeling pretty good and has been complaint with prescribed medications.    Recently noted enlarging soft lesion on her right wrist, not paiful.  There is no redness, no fluctuations fel.  Never had similar sx before.    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     Patient follows with endocrinologist regarding right thyroid nodule. Underwent biopsy, results were benign.     Follows with dermatologist regarding skin moles and skin tags, no suspicions lesions found on recent exam according to patient.     Review of Systems   Constitutional: Negative.  Negative for activity change, appetite change and unexpected weight change.   HENT: Negative.  Negative for congestion, ear discharge, ear pain, hearing loss, mouth sores, nosebleeds, sinus pressure, sinus pain, sore throat, trouble swallowing and voice change.    Eyes: Negative.  Negative for pain, discharge, redness and visual disturbance.   Respiratory: Negative.  Negative for cough, chest tightness, shortness of breath, wheezing and stridor.    Cardiovascular: Negative.  Negative for chest pain, palpitations and leg swelling.   Gastrointestinal: Negative.  Negative for abdominal pain, blood in stool, constipation, diarrhea, nausea and vomiting.   Endocrine: Negative.  Negative for polydipsia, polyphagia and polyuria.   Genitourinary: Negative.  Negative for difficulty urinating, dysuria, flank pain, frequency and urgency.   Musculoskeletal: Negative.  Negative for arthralgias, back pain, gait problem, joint swelling, myalgias, neck pain and neck stiffness.   Skin: Negative.  Negative for color  "change, rash and wound.   Allergic/Immunologic: Negative.  Negative for environmental allergies, food allergies and immunocompromised state.   Neurological: Negative.  Negative for dizziness, tremors, seizures, syncope, facial asymmetry, speech difficulty, weakness, light-headedness, numbness and headaches.   Hematological: Negative.  Negative for adenopathy. Does not bruise/bleed easily.   Psychiatric/Behavioral: Negative.  Negative for agitation, behavioral problems, confusion, hallucinations, sleep disturbance and suicidal ideas. The patient is not nervous/anxious.    All other systems reviewed and are negative.      Objective     Review of systems was performed and all systems were negative except what in HPI    /60 (BP Location: Left arm, Patient Position: Sitting, BP Cuff Size: Adult)   Pulse 78   Temp 36.7 °C (98 °F) (Temporal)   Resp 16   Ht 1.651 m (5' 5\")   Wt 85.5 kg (188 lb 9.6 oz)   SpO2 99%   BMI 31.38 kg/m²        Physical Exam  Vitals and nursing note reviewed.   Constitutional:       General: She is not in acute distress.     Appearance: Normal appearance.   HENT:      Head: Normocephalic and atraumatic.      Right Ear: External ear normal.      Left Ear: External ear normal.      Nose: Nose normal. No congestion or rhinorrhea.   Eyes:      General:         Right eye: No discharge.         Left eye: No discharge.      Extraocular Movements: Extraocular movements intact.      Conjunctiva/sclera: Conjunctivae normal.      Pupils: Pupils are equal, round, and reactive to light.   Cardiovascular:      Rate and Rhythm: Normal rate and regular rhythm.      Pulses: Normal pulses.      Heart sounds: Normal heart sounds. No murmur heard.     No friction rub. No gallop.   Pulmonary:      Effort: Pulmonary effort is normal. No respiratory distress.      Breath sounds: Normal breath sounds. No stridor. No wheezing, rhonchi or rales.   Chest:      Chest wall: No tenderness.   Abdominal:      " General: Bowel sounds are normal.      Palpations: Abdomen is soft. There is no mass.      Tenderness: There is no abdominal tenderness. There is no guarding or rebound.   Musculoskeletal:         General: Deformity present. No swelling. Normal range of motion.      Cervical back: Normal range of motion and neck supple. No rigidity or tenderness.      Right lower leg: No edema.      Left lower leg: No edema.      Comments: Soft tissue mass about 1.5 cm in diameter noted on ulnar aspect of right wrist. Not tender to palpation, no redness,  no fluctuations felt.   Lymphadenopathy:      Cervical: No cervical adenopathy.   Skin:     General: Skin is warm and dry.      Coloration: Skin is not jaundiced.      Findings: No bruising or erythema.   Neurological:      General: No focal deficit present.      Mental Status: She is alert and oriented to person, place, and time. Mental status is at baseline.      Cranial Nerves: No cranial nerve deficit.      Motor: No weakness.      Coordination: Coordination normal.      Gait: Gait normal.   Psychiatric:         Mood and Affect: Mood normal.         Behavior: Behavior normal.         Thought Content: Thought content normal.         Judgment: Judgment normal.                 Assessment/Plan   Problem List Items Addressed This Visit             ICD-10-CM       Musculoskeletal and Injuries    Lesion of wrist - Primary M25.9     Consult hand ortho as discussed.         Relevant Orders    Referral to Orthopaedic Surgery    Right arm pain M79.601     Start arm/shoulder exercises (examples of home exercises given). Consult PT if not better.         Relevant Orders    Referral to Physical Therapy     It was a pleasure to see you today.  I would like to remind you about importance of a healthy lifestyle in order to improve your well-being and live longer.  Try to engage in physical activities for at least 150 minutes per week.  Eat about 10 servings of fruits and vegetables daily. My  advice is 2 servings of fruits and 8 servings of vegetables.  For vegetables choose at least half of them green and at least half of them fresh.  Please avoid sugar, salt, fried food and saturated fat.    I spent a total of 25 minutes on the date of service for follow up visit, which included preparing to see the patient, face-to-face patient care, completing clinical documentation, obtaining and/or reviewing separately obtained history, performing a medically appropriate examination, counseling and educating the patient/family/caregiver, ordering medications, tests, or procedures, communicating with other health care providers (not separately reported), independently interpreting results (not separately reported), communicating results to the patient/family/caregiver, and care coordination (not separately reported).    F/up in 1 month or sooner if needed.

## 2024-11-20 ENCOUNTER — OFFICE VISIT (OUTPATIENT)
Dept: ORTHOPEDIC SURGERY | Facility: CLINIC | Age: 49
End: 2024-11-20
Payer: COMMERCIAL

## 2024-11-20 DIAGNOSIS — M67.431 GANGLION CYST OF DORSUM OF RIGHT WRIST: ICD-10-CM

## 2024-11-20 PROCEDURE — 99243 OFF/OP CNSLTJ NEW/EST LOW 30: CPT | Performed by: ORTHOPAEDIC SURGERY

## 2024-11-20 PROCEDURE — 99213 OFFICE O/P EST LOW 20 MIN: CPT | Performed by: ORTHOPAEDIC SURGERY

## 2024-11-20 PROCEDURE — 1036F TOBACCO NON-USER: CPT | Performed by: ORTHOPAEDIC SURGERY

## 2024-11-20 NOTE — LETTER
November 29, 2024     Lorena Thomas MD PhD  [address]  [facility], [address]  [facility] [id number]    Patient: Chelsey Anand   YOB: 1975   Date of Visit: 11/20/2024       Dear Dr. Lorena Thomas MD PhD:    Thank you for referring Chelsey Anand to me for evaluation. Below are my notes for this consultation.  If you have questions, please do not hesitate to call me. I look forward to following your patient along with you.       Sincerely,     Fabien Metzger MD      CC: No Recipients  ______________________________________________________________________________________    CHIEF COMPLAINT         Right wrist mass    ASSESSMENT + PLAN    Right dorsal-ulnar wrist ganglion    The benign nature of the ganglion and its waxing and waning size was reviewed, as was the expectation that any aching discomfort will gradually decrease over the next few months.  The options for management were reviewed, including observation, aspiration, or surgical excision, along with the likely success rates of each.    The patient wished to observe for now, which is fine.  I reviewed warning signs, which would suggest a more sinister diagnosis, and should prompt a return to clinic.    Followup with any concerns.        HISTORY OF PRESENT ILLNESS       Patient is a 49 y.o. right-hand dominant female , who presents today in consultation from Dr. Au for evaluation of a right wrist mass.  This was first noticed just last week.  There was no single specific recalled trauma.  She simply glanced at her wrist at a different angle as she was sitting down and saw it.  The mass is painful only when bumped.  No night pain or rest pain.  No popping, clicking, or subjective instability.  No difficulty with finger or wrist motion.  No numbness or tingling.  No similar masses elsewhere or in the past, though she does have family history of ganglion in her sister.    Patient is not diabetic or  hypothyroid.  She does not smoke.      REVIEW OF SYSTEMS       A 30-item multi-system Review Of Systems was obtained on today's intake form.  This was reviewed with the patient and is correct.  The pertinent positives and negatives are listed above.  The form has been scanned separately into the medical record.      PHYSICAL EXAM    Constitutional:    Appears stated age. Well-developed and well-nourished overweight female in no acute distress.  Psychiatric:         Pleasant normal mood and affect. Behavior is appropriate for the situation.   Head:                   Normocephalic and atraumatic.  Eyes:                    Pupils are equal and round.  Cardiovascular:  2+ radial and ulnar pulses. Fingers well-perfused.  Respiratory:        Effort normal. No respiratory distress. Speaking in complete sentences.  Neurologic:       Alert and oriented to person, place, and time.  Skin:                Skin is intact, warm and dry.  Hematologic / Lymphatic:    No lymphedema or lymphangitis.    Extremities / Musculoskeletal:                      Skin of the right hand and wrist is intact with no erythema, ecchymosis, or generalized swelling.  There is a focal 8 x 8 x 4 mm soft mass best seen with the wrist in flexion over the dorsal ulnar aspect of the wrist.  Normal overlying skin.  The mass is nontender, nonpulsatile, with no Tinel's sign.  It does not move with the underlying tendons.  Full composite finger flexion extension with full intrinsic plus minus posture.  No triggering.  Stable DRUJ in all stations.  Negative Echevarria.  Negative midcarpal shift.  Negative PT compression and LT shear tests.  Sensation intact to light touch in all distributions.  Capillary refill less than 2 seconds.      IMAGING / LABS / EMGs           None pertinent      Past Medical History:   Diagnosis Date   • Noninfective gastroenteritis and colitis, unspecified     Colitis   • Personal history of other diseases of the nervous system and sense  organs     History of migraine headaches       Medication Documentation Review Audit       Reviewed by Lorena Thomas MD PhD (Physician) on 11/19/24 at 1548      Medication Order Taking? Sig Documenting Provider Last Dose Status   cholecalciferol (Vitamin D-3) 50 mcg (2,000 unit) capsule [id number] Yes Take 125 mcg by mouth early in the morning.. Historical Provider, MD Taking Active   multivitamin tablet [id number] Yes Take by mouth. Historical Provider, MD Taking Active                    Allergies   Allergen Reactions   • Penicillins Rash       Social History     Socioeconomic History   • Marital status:      Spouse name: Not on file   • Number of children: Not on file   • Years of education: Not on file   • Highest education level: Not on file   Occupational History   • Not on file   Tobacco Use   • Smoking status: Never     Passive exposure: Never   • Smokeless tobacco: Never   Vaping Use   • Vaping status: Never Used   Substance and Sexual Activity   • Alcohol use: Never   • Drug use: Never   • Sexual activity: Not on file   Other Topics Concern   • Not on file   Social History Narrative   • Not on file     Social Drivers of Health     Financial Resource Strain: Not on file   Food Insecurity: Not on file   Transportation Needs: Not on file   Physical Activity: Not on file   Stress: Not on file   Social Connections: Not on file   Intimate Partner Violence: Not on file   Housing Stability: Not on file       Past Surgical History:   Procedure Laterality Date   • COLONOSCOPY  11/28/2012    Complete Colonoscopy         Electronically Signed      XIANG Metzger MD      Orthopaedic Hand Surgery      [phone]

## 2024-11-20 NOTE — PROGRESS NOTES
CHIEF COMPLAINT         Right wrist mass    ASSESSMENT + PLAN    Right dorsal-ulnar wrist ganglion    The benign nature of the ganglion and its waxing and waning size was reviewed, as was the expectation that any aching discomfort will gradually decrease over the next few months.  The options for management were reviewed, including observation, aspiration, or surgical excision, along with the likely success rates of each.    The patient wished to observe for now, which is fine.  I reviewed warning signs, which would suggest a more sinister diagnosis, and should prompt a return to clinic.    Followup with any concerns.        HISTORY OF PRESENT ILLNESS       Patient is a 49 y.o. right-hand dominant female , who presents today in consultation from Dr. Au for evaluation of a right wrist mass.  This was first noticed just last week.  There was no single specific recalled trauma.  She simply glanced at her wrist at a different angle as she was sitting down and saw it.  The mass is painful only when bumped.  No night pain or rest pain.  No popping, clicking, or subjective instability.  No difficulty with finger or wrist motion.  No numbness or tingling.  No similar masses elsewhere or in the past, though she does have family history of ganglion in her sister.    Patient is not diabetic or hypothyroid.  She does not smoke.      REVIEW OF SYSTEMS       A 30-item multi-system Review Of Systems was obtained on today's intake form.  This was reviewed with the patient and is correct.  The pertinent positives and negatives are listed above.  The form has been scanned separately into the medical record.      PHYSICAL EXAM    Constitutional:    Appears stated age. Well-developed and well-nourished overweight female in no acute distress.  Psychiatric:         Pleasant normal mood and affect. Behavior is appropriate for the situation.   Head:                   Normocephalic and atraumatic.  Eyes:                     Pupils are equal and round.  Cardiovascular:  2+ radial and ulnar pulses. Fingers well-perfused.  Respiratory:        Effort normal. No respiratory distress. Speaking in complete sentences.  Neurologic:       Alert and oriented to person, place, and time.  Skin:                Skin is intact, warm and dry.  Hematologic / Lymphatic:    No lymphedema or lymphangitis.    Extremities / Musculoskeletal:                      Skin of the right hand and wrist is intact with no erythema, ecchymosis, or generalized swelling.  There is a focal 8 x 8 x 4 mm soft mass best seen with the wrist in flexion over the dorsal ulnar aspect of the wrist.  Normal overlying skin.  The mass is nontender, nonpulsatile, with no Tinel's sign.  It does not move with the underlying tendons.  Full composite finger flexion extension with full intrinsic plus minus posture.  No triggering.  Stable DRUJ in all stations.  Negative Echevarria.  Negative midcarpal shift.  Negative PT compression and LT shear tests.  Sensation intact to light touch in all distributions.  Capillary refill less than 2 seconds.      IMAGING / LABS / EMGs           None pertinent      Past Medical History:   Diagnosis Date    Noninfective gastroenteritis and colitis, unspecified     Colitis    Personal history of other diseases of the nervous system and sense organs     History of migraine headaches       Medication Documentation Review Audit       Reviewed by Lorena Thomas MD PhD (Physician) on 11/19/24 at 1548      Medication Order Taking? Sig Documenting Provider Last Dose Status   cholecalciferol (Vitamin D-3) 50 mcg (2,000 unit) capsule [id number] Yes Take 125 mcg by mouth early in the morning.. Historical Provider, MD Taking Active   multivitamin tablet [id number] Yes Take by mouth. Historical Provider, MD Taking Active                    Allergies   Allergen Reactions    Penicillins Rash       Social History     Socioeconomic History    Marital status:       Spouse name: Not on file    Number of children: Not on file    Years of education: Not on file    Highest education level: Not on file   Occupational History    Not on file   Tobacco Use    Smoking status: Never     Passive exposure: Never    Smokeless tobacco: Never   Vaping Use    Vaping status: Never Used   Substance and Sexual Activity    Alcohol use: Never    Drug use: Never    Sexual activity: Not on file   Other Topics Concern    Not on file   Social History Narrative    Not on file     Social Drivers of Health     Financial Resource Strain: Not on file   Food Insecurity: Not on file   Transportation Needs: Not on file   Physical Activity: Not on file   Stress: Not on file   Social Connections: Not on file   Intimate Partner Violence: Not on file   Housing Stability: Not on file       Past Surgical History:   Procedure Laterality Date    COLONOSCOPY  11/28/2012    Complete Colonoscopy         Electronically Signed      XIANG Metzger MD      Orthopaedic Hand Surgery      [phone]

## 2024-12-10 ENCOUNTER — APPOINTMENT (OUTPATIENT)
Dept: ORTHOPEDIC SURGERY | Facility: CLINIC | Age: 49
End: 2024-12-10
Payer: COMMERCIAL

## 2024-12-10 ENCOUNTER — OFFICE VISIT (OUTPATIENT)
Dept: PRIMARY CARE | Facility: CLINIC | Age: 49
End: 2024-12-10
Payer: COMMERCIAL

## 2024-12-10 VITALS
HEIGHT: 65 IN | HEART RATE: 62 BPM | WEIGHT: 188 LBS | TEMPERATURE: 97.7 F | BODY MASS INDEX: 31.32 KG/M2 | RESPIRATION RATE: 14 BRPM | OXYGEN SATURATION: 98 % | SYSTOLIC BLOOD PRESSURE: 138 MMHG | DIASTOLIC BLOOD PRESSURE: 86 MMHG

## 2024-12-10 DIAGNOSIS — M54.41 ACUTE BILATERAL LOW BACK PAIN WITH RIGHT-SIDED SCIATICA: Primary | ICD-10-CM

## 2024-12-10 PROCEDURE — 3008F BODY MASS INDEX DOCD: CPT | Performed by: NURSE PRACTITIONER

## 2024-12-10 PROCEDURE — 99214 OFFICE O/P EST MOD 30 MIN: CPT | Performed by: NURSE PRACTITIONER

## 2024-12-10 PROCEDURE — 1036F TOBACCO NON-USER: CPT | Performed by: NURSE PRACTITIONER

## 2024-12-10 RX ORDER — METHYLPREDNISOLONE 4 MG/1
TABLET ORAL
Qty: 21 TABLET | Refills: 0 | Status: SHIPPED | OUTPATIENT
Start: 2024-12-10 | End: 2024-12-16

## 2024-12-10 RX ORDER — CYCLOBENZAPRINE HCL 5 MG
5 TABLET ORAL NIGHTLY PRN
Qty: 30 TABLET | Refills: 0 | Status: SHIPPED | OUTPATIENT
Start: 2024-12-10 | End: 2025-02-08

## 2024-12-10 RX ORDER — IBUPROFEN 800 MG/1
800 TABLET ORAL 3 TIMES DAILY PRN
Qty: 180 TABLET | Refills: 3 | Status: SHIPPED | OUTPATIENT
Start: 2024-12-10 | End: 2025-12-10

## 2024-12-10 ASSESSMENT — ENCOUNTER SYMPTOMS: BACK PAIN: 1

## 2024-12-10 NOTE — ASSESSMENT & PLAN NOTE
Recommended ICE TID  If not improving after 1-2 weeks please call  If you decide to take the Medrol dose pack please hold the Ibuprofen until finished with steroid

## 2024-12-10 NOTE — PROGRESS NOTES
Subjective   Patient ID: Chelsey Anand is a 49 y.o. female who presents for Back Pain.  She reports she was lifting boxes and carrying some heavy items, most likely was not lifting properly either. She has tried over the counter NSAID and has not helped. It is painful for her to walk and stand from a sitting position    Back Pain  This is a new problem. The current episode started in the past 7 days (5 days ago). The problem occurs constantly. The problem is unchanged. The pain is present in the thoracic spine and lumbar spine. The symptoms are aggravated by position. She has tried bed rest and heat for the symptoms. The treatment provided mild relief.       Review of Systems   Musculoskeletal:  Positive for back pain.       Objective   Physical Exam  Vitals and nursing note reviewed.   Constitutional:       General: She is not in acute distress.  HENT:      Head: Normocephalic and atraumatic.   Eyes:      Pupils: Pupils are equal, round, and reactive to light.   Cardiovascular:      Rate and Rhythm: Normal rate and regular rhythm.   Pulmonary:      Effort: Pulmonary effort is normal.   Musculoskeletal:         General: Signs of injury present.      Right lower leg: No edema.      Left lower leg: No edema.   Skin:     General: Skin is warm and dry.   Neurological:      Mental Status: She is alert and oriented to person, place, and time.   Psychiatric:         Mood and Affect: Mood normal.         Assessment/Plan   Problem List Items Addressed This Visit             ICD-10-CM    Acute bilateral low back pain with right-sided sciatica - Primary M54.41     Recommended ICE TID  If not improving after 1-2 weeks please call  If you decide to take the Medrol dose pack please hold the Ibuprofen until finished with steroid           Relevant Medications    cyclobenzaprine (Flexeril) 5 mg tablet    ibuprofen 800 mg tablet    methylPREDNISolone (Medrol Dospak) 4 mg tablets    Other Relevant Orders    Referral to Physical  Therapy        Visit Vitals  /86   Pulse 62   Temp 36.5 °C (97.7 °F)   Resp 14        ALEXANDREA Toscano-CNP 12/10/24 10:46 AM

## 2024-12-20 ENCOUNTER — APPOINTMENT (OUTPATIENT)
Dept: PRIMARY CARE | Facility: CLINIC | Age: 49
End: 2024-12-20
Payer: COMMERCIAL

## 2024-12-30 ENCOUNTER — APPOINTMENT (OUTPATIENT)
Dept: PRIMARY CARE | Facility: CLINIC | Age: 49
End: 2024-12-30
Payer: COMMERCIAL

## 2024-12-30 VITALS
DIASTOLIC BLOOD PRESSURE: 80 MMHG | WEIGHT: 188.8 LBS | BODY MASS INDEX: 30.34 KG/M2 | HEART RATE: 72 BPM | OXYGEN SATURATION: 95 % | HEIGHT: 66 IN | SYSTOLIC BLOOD PRESSURE: 126 MMHG | TEMPERATURE: 97.5 F

## 2024-12-30 DIAGNOSIS — Z00.00 HEALTHCARE MAINTENANCE: Primary | ICD-10-CM

## 2024-12-30 DIAGNOSIS — E78.5 DYSLIPIDEMIA: ICD-10-CM

## 2024-12-30 PROCEDURE — 93000 ELECTROCARDIOGRAM COMPLETE: CPT | Performed by: INTERNAL MEDICINE

## 2024-12-30 PROCEDURE — 99396 PREV VISIT EST AGE 40-64: CPT | Performed by: INTERNAL MEDICINE

## 2024-12-30 PROCEDURE — 1036F TOBACCO NON-USER: CPT | Performed by: INTERNAL MEDICINE

## 2024-12-30 PROCEDURE — 3008F BODY MASS INDEX DOCD: CPT | Performed by: INTERNAL MEDICINE

## 2024-12-30 ASSESSMENT — ENCOUNTER SYMPTOMS
NECK PAIN: 0
FLANK PAIN: 0
DYSURIA: 0
BACK PAIN: 0
SLEEP DISTURBANCE: 0
MYALGIAS: 0
EYE REDNESS: 0
COUGH: 0
ACTIVITY CHANGE: 0
ARTHRALGIAS: 0
HEMATOLOGIC/LYMPHATIC NEGATIVE: 1
NUMBNESS: 0
JOINT SWELLING: 0
HALLUCINATIONS: 0
CONSTITUTIONAL NEGATIVE: 1
CARDIOVASCULAR NEGATIVE: 1
VOICE CHANGE: 0
SPEECH DIFFICULTY: 0
PALPITATIONS: 0
POLYDIPSIA: 0
DIZZINESS: 0
NERVOUS/ANXIOUS: 0
EYE DISCHARGE: 0
APPETITE CHANGE: 0
ADENOPATHY: 0
GASTROINTESTINAL NEGATIVE: 1
COLOR CHANGE: 0
NAUSEA: 0
NEUROLOGICAL NEGATIVE: 1
WOUND: 0
BLOOD IN STOOL: 0
CONSTIPATION: 0
POLYPHAGIA: 0
AGITATION: 0
UNEXPECTED WEIGHT CHANGE: 0
FACIAL ASYMMETRY: 0
WHEEZING: 0
EYES NEGATIVE: 1
SINUS PAIN: 0
EYE PAIN: 0
ENDOCRINE NEGATIVE: 1
DIARRHEA: 0
PSYCHIATRIC NEGATIVE: 1
STRIDOR: 0
VOMITING: 0
ALLERGIC/IMMUNOLOGIC NEGATIVE: 1
RESPIRATORY NEGATIVE: 1
HEADACHES: 0
SHORTNESS OF BREATH: 0
MUSCULOSKELETAL NEGATIVE: 1
LIGHT-HEADEDNESS: 0
TREMORS: 0
TROUBLE SWALLOWING: 0
NECK STIFFNESS: 0
FREQUENCY: 0
CONFUSION: 0
SORE THROAT: 0
CHEST TIGHTNESS: 0
SINUS PRESSURE: 0
SEIZURES: 0
DIFFICULTY URINATING: 0
BRUISES/BLEEDS EASILY: 0
WEAKNESS: 0
ABDOMINAL PAIN: 0

## 2024-12-30 NOTE — PROGRESS NOTES
Subjective   Patient ID: Chelsey Anand is a 49 y.o. female who presents for Annual Exam (Patient is here for an annual physical exam. ).  HPI    Patient has been feeling pretty good and has been complaint with prescribed medications.    We reviewed and discussed details of recent blood work: CBC, CMP, TSH, Lipid panel,  Vit D.  Results within acceptable range.    Last mammogram: 1/2025  PAP: per GYN  Colonoscopy: 2022, next 2032    Elevated blood pressure noted on arrival. Decreased after resting in the office. I personally rechecked patient's blood pressure.     Patient follows with endocrinologist regarding right thyroid nodule. Underwent biopsy, results were benign.      Follows with dermatologist regarding skin moles and skin tags, no suspicions lesions found on recent exam according to patient.     Obesity counseling:  Patient has been having difficulties losing weight.  Patient's BMI is above 30.   Patient has tried low calorie, low carbohydrate diet and exercise program for more than 6 months without success.  We discussed weight loss subject at length, low calorie and low carbohydrate diet advised with goal of 1500 melchor/day and 50g carbohydrates per day. Patient agreed to try above along with performing physical activities/exercise for at least 150 minutes per week.   Will follow up in 6 months to check on the progress of weight loss.  Planing to lose 25 lbs by then.      Review of Systems   Constitutional: Negative.  Negative for activity change, appetite change and unexpected weight change.   HENT: Negative.  Negative for congestion, ear discharge, ear pain, hearing loss, mouth sores, nosebleeds, sinus pressure, sinus pain, sore throat, trouble swallowing and voice change.    Eyes: Negative.  Negative for pain, discharge, redness and visual disturbance.   Respiratory: Negative.  Negative for cough, chest tightness, shortness of breath, wheezing and stridor.    Cardiovascular: Negative.  Negative for chest  "pain, palpitations and leg swelling.   Gastrointestinal: Negative.  Negative for abdominal pain, blood in stool, constipation, diarrhea, nausea and vomiting.   Endocrine: Negative.  Negative for polydipsia, polyphagia and polyuria.   Genitourinary: Negative.  Negative for difficulty urinating, dysuria, flank pain, frequency and urgency.   Musculoskeletal: Negative.  Negative for arthralgias, back pain, gait problem, joint swelling, myalgias, neck pain and neck stiffness.   Skin: Negative.  Negative for color change, rash and wound.   Allergic/Immunologic: Negative.  Negative for environmental allergies, food allergies and immunocompromised state.   Neurological: Negative.  Negative for dizziness, tremors, seizures, syncope, facial asymmetry, speech difficulty, weakness, light-headedness, numbness and headaches.   Hematological: Negative.  Negative for adenopathy. Does not bruise/bleed easily.   Psychiatric/Behavioral: Negative.  Negative for agitation, behavioral problems, confusion, hallucinations, sleep disturbance and suicidal ideas. The patient is not nervous/anxious.    All other systems reviewed and are negative.    Breasts symmetrical. No nipple discharge noted. No lumps or masses palpated bilaterally.    Lymph nodes: axillary, supra and subclavicular not palpable bilaterally.      Objective     Review of systems was performed and all systems were negative except what in HPI    /80   Pulse 72   Temp 36.4 °C (97.5 °F) (Temporal)   Ht 1.676 m (5' 6\")   Wt 85.6 kg (188 lb 12.8 oz)   SpO2 95%   BMI 30.47 kg/m²    Physical Exam  Vitals and nursing note reviewed.   Constitutional:       General: She is not in acute distress.     Appearance: Normal appearance.   HENT:      Head: Normocephalic and atraumatic.      Right Ear: Tympanic membrane, ear canal and external ear normal.      Left Ear: Tympanic membrane, ear canal and external ear normal.      Nose: Nose normal. No congestion or rhinorrhea.      " Mouth/Throat:      Mouth: Mucous membranes are moist.      Pharynx: Oropharynx is clear.   Eyes:      General:         Right eye: No discharge.         Left eye: No discharge.      Extraocular Movements: Extraocular movements intact.      Conjunctiva/sclera: Conjunctivae normal.      Pupils: Pupils are equal, round, and reactive to light.   Cardiovascular:      Rate and Rhythm: Normal rate and regular rhythm.      Pulses: Normal pulses.      Heart sounds: Normal heart sounds. No murmur heard.     No friction rub. No gallop.   Pulmonary:      Effort: Pulmonary effort is normal. No respiratory distress.      Breath sounds: Normal breath sounds. No stridor. No wheezing, rhonchi or rales.   Chest:      Chest wall: No tenderness.   Abdominal:      General: Bowel sounds are normal.      Palpations: Abdomen is soft. There is no mass.      Tenderness: There is no abdominal tenderness. There is no guarding or rebound.   Musculoskeletal:         General: No swelling or deformity. Normal range of motion.      Cervical back: Normal range of motion and neck supple. No rigidity or tenderness.      Right lower leg: No edema.      Left lower leg: No edema.   Lymphadenopathy:      Cervical: No cervical adenopathy.   Skin:     General: Skin is warm and dry.      Coloration: Skin is not jaundiced.      Findings: No bruising or erythema.   Neurological:      General: No focal deficit present.      Mental Status: She is alert and oriented to person, place, and time. Mental status is at baseline.      Cranial Nerves: No cranial nerve deficit.      Motor: No weakness.      Coordination: Coordination normal.      Gait: Gait normal.   Psychiatric:         Mood and Affect: Mood normal.         Behavior: Behavior normal.         Thought Content: Thought content normal.         Judgment: Judgment normal.     Breasts symmetrical. No nipple discharge noted. No lumps or masses palpated bilaterally.    Lymph nodes: axillary, supra and  subclavicular not palpable bilaterally.    Assessment/Plan   Problem List Items Addressed This Visit             ICD-10-CM       Cardiac and Vasculature    Dyslipidemia E78.5     Low cholesterol and low carbohydrate diet is advised.   Weight loss advised.            Health Encounters    Healthcare maintenance - Primary Z00.00    Relevant Orders    ECG 12 lead (Clinic Performed) (Completed)   It was a pleasure to see you today.  I would like to remind you about importance of a healthy lifestyle in order to improve your well-being and live longer.  Try to engage in physical activities for at least 150 minutes per week.  Eat about 10 servings of fruits and vegetables daily. My advice is 2 servings of fruits and 8 servings of vegetables.  For vegetables choose at least half of them green and at least half of them fresh.  Please avoid sugar, salt, fried food and saturated fat.    F/up in 6 months or sooner if needed.

## 2025-01-09 DIAGNOSIS — E04.2 MULTIPLE THYROID NODULES: ICD-10-CM

## 2025-01-10 ENCOUNTER — APPOINTMENT (OUTPATIENT)
Facility: CLINIC | Age: 50
End: 2025-01-10
Payer: COMMERCIAL

## 2025-01-10 ENCOUNTER — HOSPITAL ENCOUNTER (OUTPATIENT)
Dept: RADIOLOGY | Facility: CLINIC | Age: 50
Discharge: HOME | End: 2025-01-10
Payer: COMMERCIAL

## 2025-01-10 DIAGNOSIS — E04.2 MULTIPLE THYROID NODULES: ICD-10-CM

## 2025-01-10 PROCEDURE — 76536 US EXAM OF HEAD AND NECK: CPT

## 2025-01-17 ENCOUNTER — OFFICE VISIT (OUTPATIENT)
Facility: CLINIC | Age: 50
End: 2025-01-17
Payer: COMMERCIAL

## 2025-01-17 VITALS — WEIGHT: 187.7 LBS | HEIGHT: 66 IN | BODY MASS INDEX: 30.17 KG/M2

## 2025-01-17 DIAGNOSIS — E04.1 THYROID NODULE: Primary | ICD-10-CM

## 2025-01-17 PROCEDURE — 1036F TOBACCO NON-USER: CPT | Performed by: OTOLARYNGOLOGY

## 2025-01-17 PROCEDURE — 99213 OFFICE O/P EST LOW 20 MIN: CPT | Performed by: OTOLARYNGOLOGY

## 2025-01-17 PROCEDURE — 3008F BODY MASS INDEX DOCD: CPT | Performed by: OTOLARYNGOLOGY

## 2025-01-17 ASSESSMENT — PAIN SCALES - GENERAL: PAINLEVEL_OUTOF10: 0-NO PAIN

## 2025-01-17 NOTE — PROGRESS NOTES
History Of Present Illness  Chelsey Anand is a 49 y.o. female presenting to establish care for thyroid nodule.  Patient states that she has had this nodule followed for close to 10 years.  She previously had a biopsy that was reportedly benign.  She has ultrasounds dating back to 2013.  At that time it measured 1.3 cm.  She has several ultrasounds over the last 4 years that have not shown significant growth of the nodules.  Currently it measures about 2 cm and is categorized as a TI-RADS 3 nodule following under the cutoff for biopsy.  She has not had any voice issues.  She occasionally has some globus symptoms but also has history of reflux.  She started taking Prilosec about 1 week ago.  She does not have classic symptoms of reflux however she does have some diet and lifestyle risk factors and also reports history of snoring.    1/17/25: Patient returns for follow-up.  She recently had an ultrasound which showed the nodule is stable in size.  It does categorize it as a TI-RADS 4 nodule however this is based on a small portion of the margin which is not as well-defined.  Otherwise the nodule is stable in appearance     Past Medical History  She has a past medical history of Noninfective gastroenteritis and colitis, unspecified and Personal history of other diseases of the nervous system and sense organs.    Surgical History  She has a past surgical history that includes Colonoscopy (11/28/2012).     Social History  She reports that she has never smoked. She has never been exposed to tobacco smoke. She has never used smokeless tobacco. She reports that she does not drink alcohol and does not use drugs.    Family History  Family History   Problem Relation Name Age of Onset    Other (CABG) Father      Diabetes Other      Hypertension Other          Allergies  Penicillins    Review of Systems     Physical Exam:  CONSTITUTIONAL:  No acute distress  VOICE:  No hoarseness or other abnormality  RESPIRATION:  Breathing  comfortably, no stridor  CV:  No clubbing/cyanosis/edema in hands  EYES:  EOM intact, sclera normal  NEURO:  Alert and oriented times 3, Cranial nerves II-XII grossly intact and symmetric bilaterally  HEAD AND FACE:  Symmetric facial features, no masses or lesions, sinuses non-tender to palpation  SALIVARY GLANDS:  Parotid and submandibular glands normal bilaterally  EARS:  Normal external ears, external auditory canals, and TMs to otoscopy, normal hearing to whispered voice.  NOSE:  External nose midline, anterior rhinoscopy is normal with limited visualization to the anterior aspect of the interior turbinates, no bleeding or drainage, no lesions  ORAL CAVITY/OROPHARYNX/LIPS:  Normal mucous membranes, normal floor of mouth/tongue/OP, no masses or lesions  PHARYNGEAL WALLS:  No masses or lesions  NECK/LYMPH:  No LAD, palpable right thyroid nodule that is mobile and nontender, trachea midline  SKIN:  Neck skin is without scar or injury  PSYCH:  Alert and oriented with appropriate mood and affect        Assessment/Plan   49-year-old female here for evaluation of globus sensation as well as history of thyroid nodules.  She has a longstanding history of thyroid nodules that have been monitored since 2013.  There has not been any significant change over the last 3 years.  Compared to imaging 10 years ago there has been slight growth.  It was biopsied at some point and returned as benign.  Currently is categorized as a TI-RADS 4 but this is based on a small portion of the nodule that has a less well-defined margin.      -Thyroid nodules: We reviewed her prior ultrasounds and workup.  Over the last few years the nodules been very stable in size.  It is demonstrated only 3 mm growth in size since 2019.  Options include continued ultrasound surveillance, repeat needle biopsy, or surgery.  She is thinking she will just continue surveillance but will discuss with her .  If she wants to pursue needle biopsy she will  message us and I will place the order.  Follow-up after next ultrasound    Lele Rogers MD  Dept. of Otolaryngology - Head and Neck Surgery

## 2025-03-17 ENCOUNTER — APPOINTMENT (OUTPATIENT)
Dept: PRIMARY CARE | Facility: CLINIC | Age: 50
End: 2025-03-17
Payer: COMMERCIAL

## 2025-03-18 ENCOUNTER — APPOINTMENT (OUTPATIENT)
Dept: PRIMARY CARE | Facility: CLINIC | Age: 50
End: 2025-03-18
Payer: COMMERCIAL

## 2025-03-19 ENCOUNTER — APPOINTMENT (OUTPATIENT)
Dept: PRIMARY CARE | Facility: CLINIC | Age: 50
End: 2025-03-19
Payer: COMMERCIAL

## 2025-03-20 ENCOUNTER — OFFICE VISIT (OUTPATIENT)
Dept: PRIMARY CARE | Facility: CLINIC | Age: 50
End: 2025-03-20
Payer: COMMERCIAL

## 2025-03-20 VITALS
SYSTOLIC BLOOD PRESSURE: 121 MMHG | BODY MASS INDEX: 30.59 KG/M2 | WEIGHT: 189.5 LBS | TEMPERATURE: 97.5 F | DIASTOLIC BLOOD PRESSURE: 80 MMHG | HEART RATE: 71 BPM

## 2025-03-20 DIAGNOSIS — R20.0 NUMBNESS OF LEFT FOOT: ICD-10-CM

## 2025-03-20 DIAGNOSIS — R20.0 NUMBNESS ON LEFT SIDE: ICD-10-CM

## 2025-03-20 DIAGNOSIS — L20.9 ATOPIC DERMATITIS, UNSPECIFIED TYPE: Primary | ICD-10-CM

## 2025-03-20 DIAGNOSIS — R20.2 PARESTHESIA OF LEFT LEG: ICD-10-CM

## 2025-03-20 DIAGNOSIS — R25.2 MUSCLE CRAMPING: ICD-10-CM

## 2025-03-20 PROCEDURE — 99214 OFFICE O/P EST MOD 30 MIN: CPT | Performed by: NURSE PRACTITIONER

## 2025-03-20 PROCEDURE — 1036F TOBACCO NON-USER: CPT | Performed by: NURSE PRACTITIONER

## 2025-03-20 RX ORDER — TRIAMCINOLONE ACETONIDE 1 MG/G
CREAM TOPICAL 2 TIMES DAILY
Qty: 30 G | Refills: 1 | Status: SHIPPED | OUTPATIENT
Start: 2025-03-20

## 2025-03-20 ASSESSMENT — ENCOUNTER SYMPTOMS
WEAKNESS: 0
VOMITING: 0
FATIGUE: 0
COUGH: 0
ARTHRALGIAS: 0
AGITATION: 0
SLEEP DISTURBANCE: 0
SHORTNESS OF BREATH: 0
FEVER: 0
CONSTIPATION: 0
JOINT SWELLING: 0
NERVOUS/ANXIOUS: 0
DIARRHEA: 0
NAUSEA: 0

## 2025-03-20 NOTE — PROGRESS NOTES
Subjective   Patient ID: Chelsey Anand is a 49 y.o. female who presents for Leg Pain (Not sure if she has gout in left leg - has had sx on/off for a long time but just recently got worse.  Notices when she drinks beer it happens/Has some pain and swelling on / off ) and Rash (On lower right leg - has seen derm in past for this /Usually has a steroid cream for it).    Recently was in [place], she notices when she drinks a beer, she gets a cramp with numbness and tingling in her Lt leg from knee down. She also has some LT medial knee pain. This appears to be a chronic problem that she has had in the past, advised following up with her PCP.    She has some small areas on her legs with red raised, itchy patches on her bilateral legs         Review of Systems   Constitutional:  Negative for fatigue and fever.   Respiratory:  Negative for cough and shortness of breath.    Cardiovascular:  Negative for chest pain and leg swelling.   Gastrointestinal:  Negative for constipation, diarrhea, nausea and vomiting.   Musculoskeletal:  Negative for arthralgias, gait problem and joint swelling.   Skin:  Positive for rash. Negative for pallor.   Neurological:  Negative for weakness.   Psychiatric/Behavioral:  Negative for agitation, behavioral problems and sleep disturbance. The patient is not nervous/anxious.        Objective   /80   Pulse 71   Temp 36.4 °C (97.5 °F)   Wt 86 kg (189 lb 8 oz)   BMI 30.59 kg/m²     Physical Exam  Vitals and nursing note reviewed.   Constitutional:       General: She is not in acute distress.  HENT:      Head: Normocephalic and atraumatic.   Pulmonary:      Effort: Pulmonary effort is normal.   Musculoskeletal:         General: No swelling, tenderness or signs of injury. Normal range of motion.      Right lower leg: No edema.      Left lower leg: No edema.   Skin:     General: Skin is warm and dry.      Findings: Erythema and rash present.   Neurological:      Mental Status: She is alert  and oriented to person, place, and time.   Psychiatric:         Mood and Affect: Mood normal.         Assessment/Plan   Problem List Items Addressed This Visit             ICD-10-CM    Numbness of left foot R20.0     Chronic problem  Follow up with PCP         Numbness on left side R20.0     Chronic problem  Follow up with PCP         Paresthesia of left leg R20.2     Chronic problem  Follow up PCp         Atopic dermatitis - Primary L20.9    Relevant Medications    triamcinolone (Kenalog) 0.1 % cream    Muscle cramping R25.2     Advised drinking more water and can take 400 mg magnesium OTC  follow up for blood work in May with PCP, Dr Au

## 2025-03-20 NOTE — ASSESSMENT & PLAN NOTE
Advised drinking more water and can take 400 mg magnesium OTC  follow up for blood work in May with PCP, Dr Au

## 2025-06-04 ENCOUNTER — TELEPHONE (OUTPATIENT)
Dept: PRIMARY CARE | Facility: CLINIC | Age: 50
End: 2025-06-04
Payer: COMMERCIAL

## 2025-06-04 DIAGNOSIS — Z00.00 HEALTHCARE MAINTENANCE: ICD-10-CM

## 2025-06-04 DIAGNOSIS — E04.1 SOLITARY THYROID NODULE: ICD-10-CM

## 2025-06-04 DIAGNOSIS — E55.9 VITAMIN D DEFICIENCY: Primary | ICD-10-CM

## 2025-06-04 NOTE — TELEPHONE ENCOUNTER
Patient went to get blood work done, waited for 30 minutes, and there were no orders placed. Patient thought that  Wanted her to get the labs done, prior to next appointment. Please advise of next steps at home number.

## 2025-06-06 ENCOUNTER — APPOINTMENT (OUTPATIENT)
Dept: PRIMARY CARE | Facility: CLINIC | Age: 50
End: 2025-06-06
Payer: COMMERCIAL

## 2025-06-06 VITALS
DIASTOLIC BLOOD PRESSURE: 60 MMHG | OXYGEN SATURATION: 96 % | HEART RATE: 90 BPM | SYSTOLIC BLOOD PRESSURE: 110 MMHG | HEIGHT: 66 IN | WEIGHT: 186 LBS | TEMPERATURE: 98 F | RESPIRATION RATE: 16 BRPM | BODY MASS INDEX: 29.89 KG/M2

## 2025-06-06 DIAGNOSIS — K21.9 GASTROESOPHAGEAL REFLUX DISEASE, UNSPECIFIED WHETHER ESOPHAGITIS PRESENT: ICD-10-CM

## 2025-06-06 DIAGNOSIS — E78.5 DYSLIPIDEMIA: Primary | ICD-10-CM

## 2025-06-06 DIAGNOSIS — E55.9 VITAMIN D DEFICIENCY: ICD-10-CM

## 2025-06-06 DIAGNOSIS — E66.09 CLASS 1 OBESITY DUE TO EXCESS CALORIES WITHOUT SERIOUS COMORBIDITY WITH BODY MASS INDEX (BMI) OF 30.0 TO 30.9 IN ADULT: ICD-10-CM

## 2025-06-06 DIAGNOSIS — E66.811 CLASS 1 OBESITY DUE TO EXCESS CALORIES WITHOUT SERIOUS COMORBIDITY WITH BODY MASS INDEX (BMI) OF 30.0 TO 30.9 IN ADULT: ICD-10-CM

## 2025-06-06 DIAGNOSIS — E04.9 GOITER: ICD-10-CM

## 2025-06-06 LAB
25(OH)D3+25(OH)D2 SERPL-MCNC: 36 NG/ML (ref 30–100)
ALBUMIN SERPL-MCNC: 4.4 G/DL (ref 3.6–5.1)
ALP SERPL-CCNC: 36 U/L (ref 37–153)
ALT SERPL-CCNC: 14 U/L (ref 6–29)
ANION GAP SERPL CALCULATED.4IONS-SCNC: 7 MMOL/L (CALC) (ref 7–17)
APPEARANCE UR: CLEAR
AST SERPL-CCNC: 17 U/L (ref 10–35)
BACTERIA #/AREA URNS HPF: ABNORMAL /HPF
BILIRUB SERPL-MCNC: 0.5 MG/DL (ref 0.2–1.2)
BILIRUB UR QL STRIP: NEGATIVE
BUN SERPL-MCNC: 16 MG/DL (ref 7–25)
CALCIUM SERPL-MCNC: 9 MG/DL (ref 8.6–10.4)
CHLORIDE SERPL-SCNC: 102 MMOL/L (ref 98–110)
CHOLEST SERPL-MCNC: 216 MG/DL
CHOLEST/HDLC SERPL: 3.7 (CALC)
CO2 SERPL-SCNC: 28 MMOL/L (ref 20–32)
COLOR UR: YELLOW
CREAT SERPL-MCNC: 0.92 MG/DL (ref 0.5–1.03)
EGFRCR SERPLBLD CKD-EPI 2021: 75 ML/MIN/1.73M2
ERYTHROCYTE [DISTWIDTH] IN BLOOD BY AUTOMATED COUNT: 12.9 % (ref 11–15)
GLUCOSE SERPL-MCNC: 84 MG/DL (ref 65–99)
GLUCOSE UR QL STRIP: NEGATIVE
HCT VFR BLD AUTO: 42.1 % (ref 35–45)
HDLC SERPL-MCNC: 59 MG/DL
HGB BLD-MCNC: 13.6 G/DL (ref 11.7–15.5)
HGB UR QL STRIP: NEGATIVE
HYALINE CASTS #/AREA URNS LPF: ABNORMAL /LPF
KETONES UR QL STRIP: NEGATIVE
LDLC SERPL CALC-MCNC: 140 MG/DL (CALC)
LEUKOCYTE ESTERASE UR QL STRIP: ABNORMAL
MCH RBC QN AUTO: 27.6 PG (ref 27–33)
MCHC RBC AUTO-ENTMCNC: 32.3 G/DL (ref 32–36)
MCV RBC AUTO: 85.6 FL (ref 80–100)
NITRITE UR QL STRIP: NEGATIVE
NONHDLC SERPL-MCNC: 157 MG/DL (CALC)
PH UR STRIP: 5.5 [PH] (ref 5–8)
PLATELET # BLD AUTO: 221 THOUSAND/UL (ref 140–400)
PMV BLD REES-ECKER: 10.2 FL (ref 7.5–12.5)
POTASSIUM SERPL-SCNC: 4.3 MMOL/L (ref 3.5–5.3)
PROT SERPL-MCNC: 6.8 G/DL (ref 6.1–8.1)
PROT UR QL STRIP: NEGATIVE
RBC # BLD AUTO: 4.92 MILLION/UL (ref 3.8–5.1)
RBC #/AREA URNS HPF: ABNORMAL /HPF
SERVICE CMNT-IMP: ABNORMAL
SODIUM SERPL-SCNC: 137 MMOL/L (ref 135–146)
SP GR UR STRIP: 1.02 (ref 1–1.03)
SQUAMOUS #/AREA URNS HPF: ABNORMAL /HPF
TRIGL SERPL-MCNC: 71 MG/DL
TSH SERPL-ACNC: 2.68 MIU/L
WBC # BLD AUTO: 5.7 THOUSAND/UL (ref 3.8–10.8)
WBC #/AREA URNS HPF: ABNORMAL /HPF

## 2025-06-06 PROCEDURE — 1036F TOBACCO NON-USER: CPT | Performed by: INTERNAL MEDICINE

## 2025-06-06 PROCEDURE — 99214 OFFICE O/P EST MOD 30 MIN: CPT | Performed by: INTERNAL MEDICINE

## 2025-06-06 PROCEDURE — 3008F BODY MASS INDEX DOCD: CPT | Performed by: INTERNAL MEDICINE

## 2025-06-06 RX ORDER — FAMOTIDINE 20 MG/1
20 TABLET, FILM COATED ORAL 2 TIMES DAILY
Qty: 60 TABLET | Refills: 5 | Status: SHIPPED | OUTPATIENT
Start: 2025-06-06 | End: 2025-12-03

## 2025-06-06 ASSESSMENT — ENCOUNTER SYMPTOMS
LIGHT-HEADEDNESS: 0
WOUND: 0
NECK PAIN: 0
WEAKNESS: 0
SHORTNESS OF BREATH: 0
ENDOCRINE NEGATIVE: 1
ALLERGIC/IMMUNOLOGIC NEGATIVE: 1
AGITATION: 0
STRIDOR: 0
WHEEZING: 0
NECK STIFFNESS: 0
NAUSEA: 0
GASTROINTESTINAL NEGATIVE: 1
COUGH: 0
CONFUSION: 0
UNEXPECTED WEIGHT CHANGE: 0
NERVOUS/ANXIOUS: 0
APPETITE CHANGE: 0
FREQUENCY: 0
MUSCULOSKELETAL NEGATIVE: 1
SORE THROAT: 0
PSYCHIATRIC NEGATIVE: 1
CONSTIPATION: 0
FACIAL ASYMMETRY: 0
JOINT SWELLING: 0
SPEECH DIFFICULTY: 0
HEADACHES: 0
CHEST TIGHTNESS: 0
SINUS PAIN: 0
SEIZURES: 0
EYES NEGATIVE: 1
DIARRHEA: 0
ARTHRALGIAS: 0
BRUISES/BLEEDS EASILY: 0
EYE PAIN: 0
ADENOPATHY: 0
POLYPHAGIA: 0
EYE REDNESS: 0
PALPITATIONS: 0
COLOR CHANGE: 0
SLEEP DISTURBANCE: 0
RESPIRATORY NEGATIVE: 1
VOICE CHANGE: 0
BLOOD IN STOOL: 0
DYSURIA: 0
HEMATOLOGIC/LYMPHATIC NEGATIVE: 1
NEUROLOGICAL NEGATIVE: 1
ACTIVITY CHANGE: 0
DIFFICULTY URINATING: 0
VOMITING: 0
NUMBNESS: 0
EYE DISCHARGE: 0
SINUS PRESSURE: 0
CARDIOVASCULAR NEGATIVE: 1
TREMORS: 0
POLYDIPSIA: 0
HALLUCINATIONS: 0
DIZZINESS: 0
FLANK PAIN: 0
BACK PAIN: 0
CONSTITUTIONAL NEGATIVE: 1
MYALGIAS: 0
ABDOMINAL PAIN: 0
TROUBLE SWALLOWING: 0

## 2025-06-06 ASSESSMENT — PATIENT HEALTH QUESTIONNAIRE - PHQ9
1. LITTLE INTEREST OR PLEASURE IN DOING THINGS: NOT AT ALL
SUM OF ALL RESPONSES TO PHQ9 QUESTIONS 1 AND 2: 0
2. FEELING DOWN, DEPRESSED OR HOPELESS: NOT AT ALL

## 2025-06-06 NOTE — PROGRESS NOTES
Subjective   Patient ID: Chelsey Anand is a 50 y.o. female who presents for Follow-up (Pt is here due to a 6 month follow up).    HPI     Patient has been feeling pretty good and has been complaint with prescribed medications.  C/o GERD symptoms not responding well to Prilosec also having difficulties losing weight.    We reviewed and discussed details of recent blood work: CBC, CMP, TSH, Lipid panel, Vit d  done in  2025. Results within acceptable range.  Mildly elevated cholesterol noted.    Obesity counseling:  Patient has been having difficulties losing weight.  Patient's BMI is above 30.   Patient has tried low calorie, low carbohydrate diet and exercise program for more than 6 months without success.  We discussed weight loss subject at length, low calorie and low carbohydrate diet advised with goal of 1500 melchor/day and 50g carbohydrates per day. Brochure provided with list of foods and their calories and carbohydrates content. Details explained.   Patient agreed to try above along with performing physical activities/exercise for at least 150 minutes per week.   Patient was offered consultation with dietician to assist with weight loss goals.  We discussed FDA approved pharmacological treatments to be used as treatment intensification along with lifestyle modifications for weight loss  We discussed consulting [facility] pharmacist to assist with medication administration, advancing the dose and monitoring treatment. Patient agreed.  Will follow up in 3 months to check on the progress of weight loss.  Time for counselin minutes.      We discussed starting weekly GIP/GLP1a injections. We discussed contraindications including medullary thyroid ca and Multiple Neoplasia Syndrome type 2.  Also possible side effects discussed: nausea, vomiting,  anaphylaxis, angioedema, pancreatitis, gallbladder disease.  Patient agreeable to try.    Patient recently started semaglutide 0.2 mg weekly which she received from  online provider. We discussed that this may not be safe treatment for her.  She agreed to speak with [facility] pharmacist to discuss FDA approved medication options.     Patient follows with endocrinologist regarding right thyroid nodule. Underwent biopsy, results were benign.      Follows with dermatologist regarding skin moles and skin tags, no suspicions lesions found on recent exam according to patient.         Review of Systems   Constitutional: Negative.  Negative for activity change, appetite change and unexpected weight change.   HENT: Negative.  Negative for congestion, ear discharge, ear pain, hearing loss, mouth sores, nosebleeds, sinus pressure, sinus pain, sore throat, trouble swallowing and voice change.    Eyes: Negative.  Negative for pain, discharge, redness and visual disturbance.   Respiratory: Negative.  Negative for cough, chest tightness, shortness of breath, wheezing and stridor.    Cardiovascular: Negative.  Negative for chest pain, palpitations and leg swelling.   Gastrointestinal: Negative.  Negative for abdominal pain, blood in stool, constipation, diarrhea, nausea and vomiting.   Endocrine: Negative.  Negative for polydipsia, polyphagia and polyuria.   Genitourinary: Negative.  Negative for difficulty urinating, dysuria, flank pain, frequency and urgency.   Musculoskeletal: Negative.  Negative for arthralgias, back pain, gait problem, joint swelling, myalgias, neck pain and neck stiffness.   Skin: Negative.  Negative for color change, rash and wound.   Allergic/Immunologic: Negative.  Negative for environmental allergies, food allergies and immunocompromised state.   Neurological: Negative.  Negative for dizziness, tremors, seizures, syncope, facial asymmetry, speech difficulty, weakness, light-headedness, numbness and headaches.   Hematological: Negative.  Negative for adenopathy. Does not bruise/bleed easily.   Psychiatric/Behavioral: Negative.  Negative for agitation, behavioral problems,  "confusion, hallucinations, sleep disturbance and suicidal ideas. The patient is not nervous/anxious.    All other systems reviewed and are negative.      Objective   /60 (BP Location: Right arm, Patient Position: Sitting, BP Cuff Size: Adult)   Pulse 90   Temp 36.7 °C (98 °F) (Temporal)   Resp 16   Ht 1.676 m (5' 6\")   Wt 84.4 kg (186 lb)   SpO2 96%   BMI 30.02 kg/m²     Physical Exam  Vitals and nursing note reviewed.   Constitutional:       General: She is not in acute distress.     Appearance: Normal appearance.   HENT:      Head: Normocephalic and atraumatic.      Right Ear: External ear normal.      Left Ear: External ear normal.      Nose: Nose normal. No congestion or rhinorrhea.   Eyes:      General:         Right eye: No discharge.         Left eye: No discharge.      Extraocular Movements: Extraocular movements intact.      Conjunctiva/sclera: Conjunctivae normal.      Pupils: Pupils are equal, round, and reactive to light.   Cardiovascular:      Rate and Rhythm: Normal rate and regular rhythm.      Pulses: Normal pulses.      Heart sounds: Normal heart sounds. No murmur heard.     No friction rub. No gallop.   Pulmonary:      Effort: Pulmonary effort is normal. No respiratory distress.      Breath sounds: Normal breath sounds. No stridor. No wheezing, rhonchi or rales.   Chest:      Chest wall: No tenderness.   Abdominal:      General: Bowel sounds are normal.      Palpations: Abdomen is soft. There is no mass.      Tenderness: There is no abdominal tenderness. There is no guarding or rebound.   Musculoskeletal:         General: No swelling or deformity. Normal range of motion.      Cervical back: Normal range of motion and neck supple. No rigidity or tenderness.      Right lower leg: No edema.      Left lower leg: No edema.   Lymphadenopathy:      Cervical: No cervical adenopathy.   Skin:     General: Skin is warm and dry.      Coloration: Skin is not jaundiced.      Findings: No bruising or " erythema.   Neurological:      General: No focal deficit present.      Mental Status: She is alert and oriented to person, place, and time. Mental status is at baseline.      Cranial Nerves: No cranial nerve deficit.      Motor: No weakness.      Coordination: Coordination normal.      Gait: Gait normal.   Psychiatric:         Mood and Affect: Mood normal.         Behavior: Behavior normal.         Thought Content: Thought content normal.         Judgment: Judgment normal.         Assessment/Plan   Problem List Items Addressed This Visit           ICD-10-CM       Cardiac and Vasculature    Dyslipidemia - Primary E78.5    Low cholesterol and low carbohydrate diet is advised.   Weight loss advised.         Relevant Orders    Referral to Clinical Pharmacy       Endocrine/Metabolic    Goiter E04.9    Clinically stable. F/up with  endocrinologist.         Vitamin D deficiency E55.9    Continue Vit d supplement.          Class 1 obesity due to excess calories without serious comorbidity with body mass index (BMI) of 30.0 to 30.9 in adult E66.811, E66.09, Z68.30    Relevant Orders    Referral to Clinical Pharmacy       Gastrointestinal and Abdominal    Gastroesophageal reflux disease K21.9    Relevant Medications    famotidine (Pepcid) 20 mg tablet     It was a pleasure to see you today.  I would like to remind you about importance of a healthy lifestyle in order to improve your well-being and live longer.  Try to engage in physical activities for at least 150 minutes per week.  Eat about 10 servings of fruits and vegetables daily. My advice is 2 servings of fruits and 8 servings of vegetables.  For vegetables choose at least half of them green and at least half of them fresh.  Please avoid sugar, salt, fried food and saturated fat.    I spent a total of 30 minutes on the date of service for follow up visit, which included preparing to see the patient, face-to-face patient care, completing clinical documentation, obtaining  and/or reviewing separately obtained history, performing a medically appropriate examination, counseling and educating the patient/family/caregiver, ordering medications, tests, or procedures, communicating with other health care providers (not separately reported), independently interpreting results (not separately reported), communicating results to the patient/family/caregiver, and care coordination (not separately reported).    F/up in 3-4 months or sooner if needed.

## 2025-06-09 ENCOUNTER — TELEPHONE (OUTPATIENT)
Dept: PRIMARY CARE | Facility: CLINIC | Age: 50
End: 2025-06-09
Payer: COMMERCIAL

## 2025-06-09 NOTE — TELEPHONE ENCOUNTER
Pt just saw Dr. Au and discuss the online medication she was taking and was advised to set up an appt with our pharmacy dept. She need the prescription with the online company and cannot see our pharmacist until 6/26/25 and will not be on the medication. Pt would like to know what she can do about a different medication. Please advise

## 2025-06-11 NOTE — PROGRESS NOTES
Clinical Pharmacy Appointment    Patient ID: Chelsey Anand is a 50 y.o. female who presents for Weight Management.    Pt is here for First appointment.     Referring Provider: Lorena Thomas M*  PCP: Lorena Thomas MD PhD  Last visit with PCP: 06/06/25   Next visit with PCP: 10/24/25    Subjective   Medication Reconciliation:  No changes made at this encounter    Drug Interactions  No relevant drug interactions were noted.    Medication System Management  Patient's preferred pharmacy:     Affordability/Accessibility:   Zepbound vials - [facility]      Interval History  Patient with relevant medical history of dyslipidemia and elevated blood pressure readings, with most recent BMI of 30.02 (06/06/25) referred to clinical pharmacy for weight loss management and possibly starting GLP-1 therapy. Patient meets FDA criteria for the use of GLP-1 therapy for weight loss management. Has subscribed for compounded semaglutide through [facility].     HPI  WEIGHT LOSS  BMI Readings from Last 3 Encounters:   06/06/25 30.02 kg/m²   03/20/25 30.59 kg/m²   01/17/25 30.30 kg/m²      Starting weight: 182.6 lbs. (06/12/25)  Ht: 167.6 cm (66 in.)  Current weight: 182.6 lbs.    Weight Goals  Maintenance BMI Goal: 18.5 to 24.9 (115-154 lbs)  Patient defined goal(s): targeting wt loss of ~20 lbs.    Lifestyle  Diet: 1-2 meals/day.   Breakfast: skips  Lunch: yogurt  Dinner: protein, salad  Snacks: rarely   Drinks: coffee (almond milk), water  Has worked with nutritionist/dietician? No - declined at this time  Has worked with weight management? No  Exercise:   Walking 5 miles daily, lift weights  Is limited by: knee pain    Other Potential Contributing Factors  Alcohol use: occasionally  Tobacco use: No  Other drug use: No  Medications that may cause weight gain: none  Mental health: No current concerns  Eating Disorders: Denies Hx of any eating disorder  Comorbidities: Anxiety and Hyperlipidemia     Pertinent PMH  "Review:  PMH of Pancreatitis: No  PMH of Retinopathy: No  PMH of MTC: No  PMH of MEN2: No    Non-Pharmacological Therapy  Weight loss techniques attempted:  Diet and exercise  Kacie Starr    Pharmacological Therapy  Current Medications: compounded semaglutide 0.25 mg, ordering through [facility]  Clarifications to above regimen: taken 2 doses  Adverse Effects: denied  Previous Medications: N/A     Insurance coverage of weight-loss medications? No, plan exclusion for both  Eligible for copay cards/programs? Yes, 's savings card      Objective   Allergies[1]  Social History     Social History Narrative    Not on file      Medication Review  Current Outpatient Medications   Medication Instructions    cholecalciferol (VITAMIN D-3) 125 mcg, Daily    famotidine (PEPCID) 20 mg, oral, 2 times daily    multivitamin tablet Take by mouth.    triamcinolone (Kenalog) 0.1 % cream Topical, 2 times daily, Apply to affected area 1-2 times daily as needed. Avoid face and groin.    Zepbound 2.5 mg, subcutaneous, Every 7 days      Vitals  BP Readings from Last 2 Encounters:   06/06/25 110/60   03/20/25 121/80     BMI Readings from Last 1 Encounters:   06/06/25 30.02 kg/m²      Labs  A1C  No results found for: \"HGBA1C\"  BMP  Lab Results   Component Value Date    CALCIUM 9.0 06/05/2025     06/05/2025    K 4.3 06/05/2025    CO2 28 06/05/2025     06/05/2025    BUN 16 06/05/2025    CREATININE 0.92 06/05/2025    EGFR 75 06/05/2025     LFTs  Lab Results   Component Value Date    ALT 14 06/05/2025    AST 17 06/05/2025    ALKPHOS 36 (L) 06/05/2025    BILITOT 0.5 06/05/2025     FLP  Lab Results   Component Value Date    TRIG 71 06/05/2025    CHOL 216 (H) 06/05/2025    LDLF 125 (H) 04/18/2023    LDLCALC 140 (H) 06/05/2025    HDL 59 06/05/2025     Urine Microalbumin  No results found for: \"MICROALBCREA\"  Weight Management  Wt Readings from Last 3 Encounters:   06/06/25 84.4 kg (186 lb)   03/20/25 86 kg (189 lb 8 oz)   01/17/25 85.1 " kg (187 lb 11.2 oz)      Completed in person education for the administration of once-weekly Zepbound (vials):    Instructed patient that Zepbound vials must be kept refrigerated, and if necessary a pen may be stored unrefrigerated at temperatures not to exceed 30C (86F) for up to 21 days.   Remove the vial from the refrigerator and check the label to make sure you have the right medicine and it has not . Additionally, ensure that the solution is not cloudy, discolored, or has particles in it.  Prior to administration, wash and rinse hands.   Next, choose your injection site (You may inject into your abdomen or thigh; another person may give you the injection in your upper arm).  Change (rotate) your injection site each week. You may use the same area of your body, but be sure to choose a different injection site in that area.  Once administration site has been selected, use a new alcohol swab to sanitize the administration site and allow to air dry.  Pull the plastic protective cap off of the vial, do NOT remove the rubber stopper.   Remove the outer wrapping from the syringe and the outer wrapping from the needle. The needle may already be attached to the syringe already and in the same package. If the needle is separate place it on top of the syringe and turn until it is tight and firmly attached. Do not allow the tip of the syringe to touch anything.   Remove the needle shield off of the needle by pulling it straight off.   Hold the syringe in one hand with the needle pointing up. With your other hand pull down on the plunger until the tip of the plunger reaches the line at 0.5.  Push the needle through the rubber stopper of the vial and push the plunger all the way in to put air into the vial. This makes it easier to pull the solution from the vial.   Turn the vial and syringe upside down and ensure the tip of the syringe is in the liquid. Slowly pull the plunger down until the tip is past the 0.5 line.    If air bubbles are present lightly tap the syringe a few time to allow them to rise to the top.   Slowly push the plunger up until the tip reaches the 0.5 mL line.   Pull the syringe out of the rubber stopper. Do not allow the needle to touch anything prior to injecting.   Insert the needle into your skin at the chosen injection site and push down on the plunger to inject your dose. The needle should stay in your skin for at least 5 seconds to ensure the whole dose has been injected.   Pull the needle out of the skin. Do not rub the area.   Do NOT recap the needle.   Dispose of the needle and syringe in a sharps container (i.e. Coffee can, laundry detergent bottle). The opened vial can be thrown in the trash.   Injections should be done on the same day every week, if you forget you have up to 4 days (96 hours) to remember to do your next dose.      Assessment/Plan   Problem List Items Addressed This Visit       Dyslipidemia    Relevant Medications    tirzepatide, weight loss, (Zepbound) 2.5 mg/0.5 mL solution    Other Relevant Orders    Referral to Clinical Pharmacy    Class 1 obesity due to excess calories without serious comorbidity with body mass index (BMI) of 30.0 to 30.9 in adult    Discussed coverage with FDA approved weight loss medications during our encounter today. With PMH of dyslipidemia and elevated blood pressure without diagnosis of hypertension and most recent BMI of 30.02 (06/06/25), patient does meet appropriate criteria set by the FDA for weight-loss management medication therapy.     At this time her pharmaceutical benefit does not provide coverage for weight loss therapy (I.e. Zepbound, Wegovy, Saxenda). We discussed alternative options including the out of pocket purchasing of Zepbound via vial and syringe from the  starting at $349/month for the 2.5 mg dose or $499/month for the 5 mg, 7.5 mg and 10 mg dose. Special offer pricing for 1st fill and refills made within 45 days of  prior delivery.     I reviewed the copay card cost for both Wegovy and Zepbound. Zepbound is a standard fixed cost at $650/month regardless of the dose selected. Wegovy is a standard fixed cost at $499/month regardless of the dose selected.    Finally, we reviewed the criteria for use of medications such as Ozempic, Mounjaro and Trulicity. She does not have type two diabetes mellitus, prediabetes, or any existing endocrine issue that would necessitate use of any aforementioned therapy.    Patient has opted to start Zepbound 2.5 mg once weekly through [facility].   Recommended to discontinue with compounded semaglutide 0.25 mg - patient is going on vacation and would like to finish current supply of compounded semaglutide as she will run out of Zepbound before she can return for a follow up visit with clinical pharmacy if she starts immediately.   Will send script for Zepbound 2.5 mg vials to [facility].  Patient will start on Zepbound 1-2 weeks into her vacation.   Reviewed general MOA, common side effects, administration instructions, storage requirements, and what to do for missed doses.   Patient aware she can contact clinical pharmacy team is any issues or concerns should arise prior to next follow up.   Will follow up in one month to assess tolerance to medication, weight loss progress, and discuss dose increase if appropriate.   Patient will be going on vacation to [facility] and return 2nd week of July - will not start on Zepbound immediately, follow up scheduled accordingly.    Patient also educated on common side effects and warnings:    Increased satiety is common  Stomach upset such as stomach cramping, nausea, constipation, etc which can be precipitated by eating fatty greasy foods and overeating  Discussed risks of GLP1ra including risk of pancreatitis, MTC and worsening of DR  Also discussed risks of gastroparesis and gastroparalysis as this is a common discussion point in the news - patient was informed  that this is rare and they have no risk factors increasing their risk for developing these issues           Relevant Medications    tirzepatide, weight loss, (Zepbound) 2.5 mg/0.5 mL solution    Other Relevant Orders    Referral to Clinical Pharmacy      Clinical Pharmacist follow-up: 07/18/25, Telehealth visit  Continue all meds under the continuation of care with the referring provider and clinical pharmacy team.    Thank you,  Ileana Reed (Suji), PharmD  PGY-1  Meds Pharmacy Resident     Verbal consent to manage patient's drug therapy was obtained from the patient. They were informed they may decline to participate or withdraw from participation in pharmacy services at any time.          [1]   Allergies  Allergen Reactions    Penicillins Rash

## 2025-06-12 ENCOUNTER — TELEMEDICINE (OUTPATIENT)
Dept: PHARMACY | Facility: HOSPITAL | Age: 50
End: 2025-06-12
Payer: COMMERCIAL

## 2025-06-12 DIAGNOSIS — E66.811 CLASS 1 OBESITY DUE TO EXCESS CALORIES WITHOUT SERIOUS COMORBIDITY WITH BODY MASS INDEX (BMI) OF 30.0 TO 30.9 IN ADULT: ICD-10-CM

## 2025-06-12 DIAGNOSIS — E66.09 CLASS 1 OBESITY DUE TO EXCESS CALORIES WITHOUT SERIOUS COMORBIDITY WITH BODY MASS INDEX (BMI) OF 30.0 TO 30.9 IN ADULT: ICD-10-CM

## 2025-06-12 DIAGNOSIS — E78.5 DYSLIPIDEMIA: ICD-10-CM

## 2025-06-12 RX ORDER — TIRZEPATIDE 2.5 MG/.5ML
2.5 INJECTION, SOLUTION SUBCUTANEOUS
Qty: 2 ML | Refills: 0 | Status: SHIPPED | OUTPATIENT
Start: 2025-06-12

## 2025-06-12 NOTE — ASSESSMENT & PLAN NOTE
Discussed coverage with FDA approved weight loss medications during our encounter today. With PMH of dyslipidemia and elevated blood pressure without diagnosis of hypertension and most recent BMI of 30.02 (06/06/25), patient does meet appropriate criteria set by the FDA for weight-loss management medication therapy.     At this time her pharmaceutical benefit does not provide coverage for weight loss therapy (I.e. Zepbound, Wegovy, Saxenda). We discussed alternative options including the out of pocket purchasing of Zepbound via vial and syringe from the  starting at $349/month for the 2.5 mg dose or $499/month for the 5 mg, 7.5 mg and 10 mg dose. Special offer pricing for 1st fill and refills made within 45 days of prior delivery.     I reviewed the copay card cost for both Wegovy and Zepbound. Zepbound is a standard fixed cost at $650/month regardless of the dose selected. Wegovy is a standard fixed cost at $499/month regardless of the dose selected.    Finally, we reviewed the criteria for use of medications such as Ozempic, Mounjaro and Trulicity. She does not have type two diabetes mellitus, prediabetes, or any existing endocrine issue that would necessitate use of any aforementioned therapy.    Patient has opted to start Zepbound 2.5 mg once weekly through [facility].   Recommended to discontinue with compounded semaglutide 0.25 mg - patient is going on vacation and would like to finish current supply of compounded semaglutide as she will run out of Zepbound before she can return for a follow up visit with clinical pharmacy if she starts immediately.   Will send script for Zepbound 2.5 mg vials to [facility].  Patient will start on Zepbound 1-2 weeks into her vacation.   Reviewed general MOA, common side effects, administration instructions, storage requirements, and what to do for missed doses.   Patient aware she can contact clinical pharmacy team is any issues or concerns should arise prior to  next follow up.   Will follow up in one month to assess tolerance to medication, weight loss progress, and discuss dose increase if appropriate.   Patient will be going on vacation to [facility] and return 2nd week of July - will not start on Zepbound immediately, follow up scheduled accordingly.    Patient also educated on common side effects and warnings:    Increased satiety is common  Stomach upset such as stomach cramping, nausea, constipation, etc which can be precipitated by eating fatty greasy foods and overeating  Discussed risks of GLP1ra including risk of pancreatitis, MTC and worsening of DR  Also discussed risks of gastroparesis and gastroparalysis as this is a common discussion point in the news - patient was informed that this is rare and they have no risk factors increasing their risk for developing these issues

## 2025-06-12 NOTE — PROGRESS NOTES
I reviewed the progress note and agree with the resident’s findings and plans as written. Case discussed with resident.    Tyler Meyer, AyahD

## 2025-07-14 ENCOUNTER — APPOINTMENT (OUTPATIENT)
Dept: SURGERY | Facility: CLINIC | Age: 50
End: 2025-07-14
Payer: COMMERCIAL

## 2025-07-14 ENCOUNTER — APPOINTMENT (OUTPATIENT)
Dept: PHARMACY | Facility: HOSPITAL | Age: 50
End: 2025-07-14
Payer: COMMERCIAL

## 2025-07-18 ENCOUNTER — APPOINTMENT (OUTPATIENT)
Dept: SURGERY | Facility: CLINIC | Age: 50
End: 2025-07-18
Payer: COMMERCIAL

## 2025-07-18 ENCOUNTER — APPOINTMENT (OUTPATIENT)
Dept: PHARMACY | Facility: HOSPITAL | Age: 50
End: 2025-07-18
Payer: COMMERCIAL

## 2025-07-18 DIAGNOSIS — E78.5 DYSLIPIDEMIA: ICD-10-CM

## 2025-07-18 DIAGNOSIS — E66.811 CLASS 1 OBESITY DUE TO EXCESS CALORIES WITHOUT SERIOUS COMORBIDITY WITH BODY MASS INDEX (BMI) OF 30.0 TO 30.9 IN ADULT: Primary | ICD-10-CM

## 2025-07-18 DIAGNOSIS — E66.09 CLASS 1 OBESITY DUE TO EXCESS CALORIES WITHOUT SERIOUS COMORBIDITY WITH BODY MASS INDEX (BMI) OF 30.0 TO 30.9 IN ADULT: Primary | ICD-10-CM

## 2025-07-18 RX ORDER — TIRZEPATIDE 2.5 MG/.5ML
2.5 INJECTION, SOLUTION SUBCUTANEOUS
Qty: 2 ML | Refills: 0 | Status: SHIPPED | OUTPATIENT
Start: 2025-07-18

## 2025-07-18 NOTE — ASSESSMENT & PLAN NOTE
Current regimen includes Zepbound 2.5 mg once weekly. Starting weight was 182.6 lbs on 06/12/25. Patient's current weight reported as 174. Has lost 8.6 lbs (~4.7% of TBW) since starting therapy plan. Today patient notes not finishing her compounded semaglutide and starting Zepbound right away so she has been off therapy for ~2 weeks. Due to seeing significant weight loss and tolerating the medication well, will continue on the 2.5 mg dose for another month since she has gone a little while off of therapy.     Medication Changes:  CONTINUE  Zepbound 2.5 mg under the skin once weekly     Future Considerations:  If weight loss slows and patient remains tolerable to the medication, could titrate Zepbound up to 5 mg at next follow-up    Monitoring and Education:  Will assess for new/worsening side effects with Zepbound therapy at next follow-up  Will obtain updated home weight to assess efficacy   Informed patient that if she notices her weight loss slow this is ok, we would then plan to titrate her dose up at next follow-up    We will follow-up in 4-weeks to see how she has tolerated continuing on her current dose of Zepbound. She was encouraged to reach out with any questions and/or concerns.

## 2025-07-18 NOTE — PROGRESS NOTES
Clinical Pharmacy Appointment    Patient ID: Chelsey Anand is a 50 y.o. female who presents for Weight Management.    Pt is here for Follow Up appointment.     Referring Provider: Lorena Thomas M*  PCP: Lorena Thomas MD PhD  Last visit with PCP: 06/06/2025   Next visit with PCP: 10/24/2025    Subjective     Drug Interactions  No relevant drug interactions were noted.    Medication System Management  Patient's preferred pharmacy:     Affordability/Accessibility:   Zepbound vials - [facility]      Interval History  Patient with relevant medical history of dyslipidemia and elevated blood pressure readings, with most recent BMI of 30.02 (06/06/25) referred to clinical pharmacy for weight loss management and possibly starting GLP-1 therapy. Patient meets FDA criteria for the use of GLP-1 therapy for weight loss management. Has subscribed for compounded semaglutide through [facility].     At last pharmacy encounter she was started on Zepbound 2.5 mg through [facility] and was instructed to stop compounded Semaglutide. She planned to finish supply of her compounded Semaglutide then switch to the Zepbound. We are following-up today to see how she has tolerated the first few weeks of Zepbound therapy.     HPI  WEIGHT LOSS  BMI Readings from Last 3 Encounters:   06/06/25 30.02 kg/m²   03/20/25 30.59 kg/m²   01/17/25 30.30 kg/m²     Wt Readings from Last 4 Encounters:   06/06/25 84.4 kg (186 lb)   03/20/25 86 kg (189 lb 8 oz)   01/17/25 85.1 kg (187 lb 11.2 oz)   12/30/24 85.6 kg (188 lb 12.8 oz)     Patient Reported Weights:   07/18/25: 174 lbs     Starting weight: 182.6 lbs. (06/12/25)  Ht: 167.6 cm (66 in.)  Current weight: 174 lbs.    Weight Goals  Maintenance BMI Goal: 18.5 to 24.9 (115-154 lbs)  Patient defined goal(s): targeting wt loss of ~20 lbs.    Lifestyle  Diet: 1-2 meals/day.   Breakfast: skips  Lunch: yogurt  Dinner: protein, salad  Snacks: rarely   Drinks: coffee (almond milk), water  Has  "worked with nutritionist/dietician? No - declined at this time  Has worked with weight management? No  Exercise:   Walking 5 miles daily, lift weights  Is limited by: knee pain    Other Potential Contributing Factors  Alcohol use: occasionally  Tobacco use: No  Other drug use: No  Medications that may cause weight gain: none  Mental health: No current concerns  Eating Disorders: Denies Hx of any eating disorder  Comorbidities: Anxiety and Hyperlipidemia     Pertinent PMH Review:  PMH of Pancreatitis: No  PMH of Retinopathy: No  PMH of MTC: No  PMH of MEN2: No    Non-Pharmacological Therapy  Weight loss techniques attempted:  Diet and exercise  Kacie Starr    Pharmacological Therapy  Current Medications:   Zepbound 2.5 mg once weekly (Wednesdays)  Last dose 07/09  Has 0 doses left at home   Adverse Effects: none reported, denies GI related side effects     Previous Medications:   Compounded Semaglutide 0.25 mg (switched to prescription Zepbound)    Insurance coverage of weight-loss medications? No, plan exclusion for both  Eligible for copay cards/programs? Yes, 's savings card, patient has commercial insurance      Objective   Allergies[1]  Social History     Social History Narrative    Not on file     Medication Reconciliation:  No changes made at this encounter    Medication Review  Current Outpatient Medications   Medication Instructions    cholecalciferol (VITAMIN D-3) 125 mcg, Daily    famotidine (PEPCID) 20 mg, oral, 2 times daily    multivitamin tablet Take by mouth.    triamcinolone (Kenalog) 0.1 % cream Topical, 2 times daily, Apply to affected area 1-2 times daily as needed. Avoid face and groin.    Zepbound 2.5 mg, subcutaneous, Every 7 days      Vitals  BP Readings from Last 2 Encounters:   06/06/25 110/60   03/20/25 121/80     BMI Readings from Last 1 Encounters:   06/06/25 30.02 kg/m²      Labs  A1C  No results found for: \"HGBA1C\"  BMP  Lab Results   Component Value Date    CALCIUM 9.0 " "06/05/2025     06/05/2025    K 4.3 06/05/2025    CO2 28 06/05/2025     06/05/2025    BUN 16 06/05/2025    CREATININE 0.92 06/05/2025    EGFR 75 06/05/2025     LFTs  Lab Results   Component Value Date    ALT 14 06/05/2025    AST 17 06/05/2025    ALKPHOS 36 (L) 06/05/2025    BILITOT 0.5 06/05/2025     FLP  Lab Results   Component Value Date    TRIG 71 06/05/2025    CHOL 216 (H) 06/05/2025    LDLF 125 (H) 04/18/2023    LDLCALC 140 (H) 06/05/2025    HDL 59 06/05/2025     Urine Microalbumin  No results found for: \"MICROALBCREA\"      Assessment/Plan   Problem List Items Addressed This Visit       Class 1 obesity due to excess calories without serious comorbidity with body mass index (BMI) of 30.0 to 30.9 in adult - Primary    Current regimen includes Zepbound 2.5 mg once weekly. Starting weight was 182.6 lbs on 06/12/25. Patient's current weight reported as 174. Has lost 8.6 lbs (~4.7% of TBW) since starting therapy plan. Today patient notes not finishing her compounded semaglutide and starting Zepbound right away so she has been off therapy for ~2 weeks. Due to seeing significant weight loss and tolerating the medication well, will continue on the 2.5 mg dose for another month since she has gone a little while off of therapy.     Medication Changes:  CONTINUE  Zepbound 2.5 mg under the skin once weekly     Future Considerations:  If weight loss slows and patient remains tolerable to the medication, could titrate Zepbound up to 5 mg at next follow-up    Monitoring and Education:  Will assess for new/worsening side effects with Zepbound therapy at next follow-up  Will obtain updated home weight to assess efficacy   Informed patient that if she notices her weight loss slow this is ok, we would then plan to titrate her dose up at next follow-up    We will follow-up in 4-weeks to see how she has tolerated continuing on her current dose of Zepbound. She was encouraged to reach out with any questions and/or " concerns.          Relevant Medications    tirzepatide, weight loss, (Zepbound) 2.5 mg/0.5 mL solution    Other Relevant Orders    Referral to Clinical Pharmacy    Dyslipidemia    Relevant Medications    tirzepatide, weight loss, (Zepbound) 2.5 mg/0.5 mL solution    Other Relevant Orders    Referral to Clinical Pharmacy     Pharmacy Follow-Up: 08/12/2025  PCP Follow-Up: 10/24/2025    Continue all meds under the continuation of care with the referring provider and clinical pharmacy team.    Thank you,    Jake Granados, PharmD   Clinical Pharmacist     Verbal consent to manage patient's drug therapy was obtained from the patient. They were informed they may decline to participate or withdraw from participation in pharmacy services at any time.          [1]   Allergies  Allergen Reactions    Penicillins Rash

## 2025-07-31 ENCOUNTER — APPOINTMENT (OUTPATIENT)
Dept: SURGERY | Facility: CLINIC | Age: 50
End: 2025-07-31
Payer: COMMERCIAL

## 2025-07-31 VITALS
WEIGHT: 174 LBS | HEART RATE: 69 BPM | SYSTOLIC BLOOD PRESSURE: 133 MMHG | TEMPERATURE: 97.8 F | DIASTOLIC BLOOD PRESSURE: 82 MMHG | BODY MASS INDEX: 27.97 KG/M2 | HEIGHT: 66 IN

## 2025-07-31 DIAGNOSIS — K64.4 EXTERNAL HEMORRHOID: Primary | ICD-10-CM

## 2025-07-31 PROCEDURE — 46600 DIAGNOSTIC ANOSCOPY SPX: CPT | Performed by: NURSE PRACTITIONER

## 2025-07-31 PROCEDURE — 99213 OFFICE O/P EST LOW 20 MIN: CPT | Performed by: NURSE PRACTITIONER

## 2025-07-31 NOTE — PROGRESS NOTES
History Of Present Illness  Chelsey Anand is a 50 y.o. female presenting with a hx of a superficial ulcer on her internal hemorrhoid and loose stools.  .     She noticed a large hemorrhoid about 3 weeks ago that was painful after her vacation.  No c/o any rectal bleeding.  The pain has lessened to nothing now.  She has been taking Metamucil daily since I saw her last and that has bulked up her stools and she will have 1-2 soft Bm's every day now.    Past Medical History  She has a past medical history of GERD (gastroesophageal reflux disease), Noninfective gastroenteritis and colitis, unspecified, and Personal history of other diseases of the nervous system and sense organs.    Surgical History  She has a past surgical history that includes Colonoscopy (11/28/2012).     Social History  She reports that she has never smoked. She has never been exposed to tobacco smoke. She has never used smokeless tobacco. She reports current alcohol use. She reports that she does not use drugs.    Family History  Family History[1]     Allergies  Penicillins    Review of Systems   All other systems reviewed and are negative.       Physical Exam  Exam conducted with a chaperone present.   Constitutional:       Appearance: Normal appearance.   HENT:      Head: Normocephalic and atraumatic.   Pulmonary:      Effort: Pulmonary effort is normal.     Musculoskeletal:         General: Normal range of motion.     Skin:     General: Skin is warm and dry.     Neurological:      General: No focal deficit present.      Mental Status: She is alert and oriented to person, place, and time.     Psychiatric:         Mood and Affect: Mood normal.         Behavior: Behavior normal.         Anoscopy    Date/Time: 7/31/2025 9:20 AM    Performed by: ROJELIO Rollins  Authorized by: ROJELIO Rollins    Consent:     Consent obtained:  Verbal    Consent given by:  Patient    Risks, benefits, and alternatives were discussed: yes     Universal protocol:     Procedure explained and questions answered to patient or proxy's satisfaction: yes      Patient identity confirmed:  Verbally with patient  Post-procedure details:     Procedure completion:  Tolerated  Comments:      No external hemorrhoids. No pain on WAYNE.  On anoscopy, looking in 360 degrees, mild irritation of the internal hemorrhoids.  No active bleeding.       Assessment/Plan   Chelsey has mild irritation of the internal hemorrhoids.  She will continue to take the fiber daily to keep her stools more bulked.  She will increase her water intake as well.  She will call with any issues and will follow up on an as needed basis.       April Baer, APRN-CNP       [1]   Family History  Problem Relation Name Age of Onset    Other (CABG) Father      Diabetes Other      Hypertension Other      Diabetes Mother Marichuy     Hypertension Mother Marichuy     Diabetes Father Cheko     Hypertension Father Cheko

## 2025-08-12 ENCOUNTER — APPOINTMENT (OUTPATIENT)
Dept: PHARMACY | Facility: HOSPITAL | Age: 50
End: 2025-08-12
Payer: COMMERCIAL

## 2025-08-12 DIAGNOSIS — E78.5 DYSLIPIDEMIA: ICD-10-CM

## 2025-08-12 DIAGNOSIS — E66.09 CLASS 1 OBESITY DUE TO EXCESS CALORIES WITHOUT SERIOUS COMORBIDITY WITH BODY MASS INDEX (BMI) OF 30.0 TO 30.9 IN ADULT: Primary | ICD-10-CM

## 2025-08-12 DIAGNOSIS — E66.811 CLASS 1 OBESITY DUE TO EXCESS CALORIES WITHOUT SERIOUS COMORBIDITY WITH BODY MASS INDEX (BMI) OF 30.0 TO 30.9 IN ADULT: Primary | ICD-10-CM

## 2025-08-12 RX ORDER — TIRZEPATIDE 5 MG/.5ML
5 INJECTION, SOLUTION SUBCUTANEOUS
Qty: 2 ML | Refills: 0 | Status: SHIPPED | OUTPATIENT
Start: 2025-08-12

## 2025-08-22 DIAGNOSIS — E78.5 DYSLIPIDEMIA: Primary | ICD-10-CM

## 2025-08-22 DIAGNOSIS — E66.811 CLASS 1 OBESITY DUE TO EXCESS CALORIES WITHOUT SERIOUS COMORBIDITY WITH BODY MASS INDEX (BMI) OF 30.0 TO 30.9 IN ADULT: ICD-10-CM

## 2025-08-22 DIAGNOSIS — E66.09 CLASS 1 OBESITY DUE TO EXCESS CALORIES WITHOUT SERIOUS COMORBIDITY WITH BODY MASS INDEX (BMI) OF 30.0 TO 30.9 IN ADULT: ICD-10-CM

## 2025-08-27 ENCOUNTER — APPOINTMENT (OUTPATIENT)
Facility: CLINIC | Age: 50
End: 2025-08-27
Payer: COMMERCIAL

## 2025-09-05 ENCOUNTER — APPOINTMENT (OUTPATIENT)
Dept: PHARMACY | Facility: HOSPITAL | Age: 50
End: 2025-09-05
Payer: COMMERCIAL

## 2025-09-26 ENCOUNTER — APPOINTMENT (OUTPATIENT)
Dept: PHARMACY | Facility: HOSPITAL | Age: 50
End: 2025-09-26
Payer: COMMERCIAL

## 2025-10-24 ENCOUNTER — APPOINTMENT (OUTPATIENT)
Dept: PRIMARY CARE | Facility: CLINIC | Age: 50
End: 2025-10-24
Payer: COMMERCIAL